# Patient Record
Sex: FEMALE | Race: WHITE | URBAN - METROPOLITAN AREA
[De-identification: names, ages, dates, MRNs, and addresses within clinical notes are randomized per-mention and may not be internally consistent; named-entity substitution may affect disease eponyms.]

---

## 2022-04-15 ENCOUNTER — HOSPITAL ENCOUNTER (EMERGENCY)
Age: 47
Discharge: HOME | End: 2022-04-15
Attending: EMERGENCY MEDICINE
Payer: COMMERCIAL

## 2022-04-15 ENCOUNTER — APPOINTMENT (OUTPATIENT)
Dept: CT IMAGING | Age: 47
End: 2022-04-15
Payer: COMMERCIAL

## 2022-04-15 VITALS
HEART RATE: 70 BPM | HEIGHT: 68 IN | WEIGHT: 190.7 LBS | TEMPERATURE: 97.6 F | OXYGEN SATURATION: 98 % | BODY MASS INDEX: 28.9 KG/M2 | DIASTOLIC BLOOD PRESSURE: 75 MMHG | RESPIRATION RATE: 16 BRPM | SYSTOLIC BLOOD PRESSURE: 129 MMHG

## 2022-04-15 DIAGNOSIS — R10.13 EPIGASTRIC PAIN: Primary | ICD-10-CM

## 2022-04-15 DIAGNOSIS — R11.0 NAUSEA: ICD-10-CM

## 2022-04-15 LAB
A/G RATIO: 1.7 (ref 1.1–2.2)
ALBUMIN SERPL-MCNC: 4.3 G/DL (ref 3.4–5)
ALP BLD-CCNC: 59 U/L (ref 40–129)
ALT SERPL-CCNC: 17 U/L (ref 10–40)
ANION GAP SERPL CALCULATED.3IONS-SCNC: 10 MMOL/L (ref 3–16)
AST SERPL-CCNC: 22 U/L (ref 15–37)
BACTERIA: ABNORMAL /HPF
BASOPHILS ABSOLUTE: 0 K/UL (ref 0–0.2)
BASOPHILS RELATIVE PERCENT: 0.3 %
BILIRUB SERPL-MCNC: <0.2 MG/DL (ref 0–1)
BILIRUBIN URINE: NEGATIVE
BLOOD, URINE: ABNORMAL
BUN BLDV-MCNC: 5 MG/DL (ref 7–20)
CALCIUM SERPL-MCNC: 9.3 MG/DL (ref 8.3–10.6)
CHLORIDE BLD-SCNC: 106 MMOL/L (ref 99–110)
CLARITY: CLEAR
CO2: 23 MMOL/L (ref 21–32)
COLOR: YELLOW
CREAT SERPL-MCNC: <0.5 MG/DL (ref 0.6–1.1)
EKG ATRIAL RATE: 78 BPM
EKG DIAGNOSIS: NORMAL
EKG P AXIS: 71 DEGREES
EKG P-R INTERVAL: 144 MS
EKG Q-T INTERVAL: 386 MS
EKG QRS DURATION: 88 MS
EKG QTC CALCULATION (BAZETT): 440 MS
EKG R AXIS: 79 DEGREES
EKG T AXIS: 62 DEGREES
EKG VENTRICULAR RATE: 78 BPM
EOSINOPHILS ABSOLUTE: 0 K/UL (ref 0–0.6)
EOSINOPHILS RELATIVE PERCENT: 0.3 %
EPITHELIAL CELLS, UA: ABNORMAL /HPF (ref 0–5)
GFR AFRICAN AMERICAN: >60
GFR NON-AFRICAN AMERICAN: >60
GLUCOSE BLD-MCNC: 109 MG/DL (ref 70–99)
GLUCOSE URINE: NEGATIVE MG/DL
HCG QUALITATIVE: NEGATIVE
HCT VFR BLD CALC: 37.7 % (ref 36–48)
HEMOGLOBIN: 12.7 G/DL (ref 12–16)
KETONES, URINE: >=80 MG/DL
LEUKOCYTE ESTERASE, URINE: NEGATIVE
LIPASE: 17 U/L (ref 13–60)
LYMPHOCYTES ABSOLUTE: 0.7 K/UL (ref 1–5.1)
LYMPHOCYTES RELATIVE PERCENT: 9 %
MCH RBC QN AUTO: 27.2 PG (ref 26–34)
MCHC RBC AUTO-ENTMCNC: 33.7 G/DL (ref 31–36)
MCV RBC AUTO: 80.7 FL (ref 80–100)
MICROSCOPIC EXAMINATION: YES
MONOCYTES ABSOLUTE: 0.8 K/UL (ref 0–1.3)
MONOCYTES RELATIVE PERCENT: 10.5 %
NEUTROPHILS ABSOLUTE: 6.2 K/UL (ref 1.7–7.7)
NEUTROPHILS RELATIVE PERCENT: 79.9 %
NITRITE, URINE: NEGATIVE
PDW BLD-RTO: 16.1 % (ref 12.4–15.4)
PH UA: 6 (ref 5–8)
PLATELET # BLD: 275 K/UL (ref 135–450)
PMV BLD AUTO: 8 FL (ref 5–10.5)
POTASSIUM REFLEX MAGNESIUM: 4.2 MMOL/L (ref 3.5–5.1)
PROTEIN UA: NEGATIVE MG/DL
RBC # BLD: 4.67 M/UL (ref 4–5.2)
RBC UA: ABNORMAL /HPF (ref 0–4)
SODIUM BLD-SCNC: 139 MMOL/L (ref 136–145)
SPECIFIC GRAVITY UA: <=1.005 (ref 1–1.03)
TOTAL PROTEIN: 6.9 G/DL (ref 6.4–8.2)
TROPONIN: <0.01 NG/ML
URINE REFLEX TO CULTURE: ABNORMAL
URINE TYPE: ABNORMAL
UROBILINOGEN, URINE: 0.2 E.U./DL
WBC # BLD: 7.7 K/UL (ref 4–11)
WBC UA: ABNORMAL /HPF (ref 0–5)

## 2022-04-15 PROCEDURE — 96374 THER/PROPH/DIAG INJ IV PUSH: CPT

## 2022-04-15 PROCEDURE — 81001 URINALYSIS AUTO W/SCOPE: CPT

## 2022-04-15 PROCEDURE — 83690 ASSAY OF LIPASE: CPT

## 2022-04-15 PROCEDURE — 99284 EMERGENCY DEPT VISIT MOD MDM: CPT

## 2022-04-15 PROCEDURE — 84703 CHORIONIC GONADOTROPIN ASSAY: CPT

## 2022-04-15 PROCEDURE — 6360000004 HC RX CONTRAST MEDICATION: Performed by: EMERGENCY MEDICINE

## 2022-04-15 PROCEDURE — 85025 COMPLETE CBC W/AUTO DIFF WBC: CPT

## 2022-04-15 PROCEDURE — 93010 ELECTROCARDIOGRAM REPORT: CPT | Performed by: INTERNAL MEDICINE

## 2022-04-15 PROCEDURE — 93005 ELECTROCARDIOGRAM TRACING: CPT | Performed by: EMERGENCY MEDICINE

## 2022-04-15 PROCEDURE — 74177 CT ABD & PELVIS W/CONTRAST: CPT

## 2022-04-15 PROCEDURE — 6360000002 HC RX W HCPCS: Performed by: EMERGENCY MEDICINE

## 2022-04-15 PROCEDURE — 84484 ASSAY OF TROPONIN QUANT: CPT

## 2022-04-15 PROCEDURE — 80053 COMPREHEN METABOLIC PANEL: CPT

## 2022-04-15 RX ORDER — DICYCLOMINE HYDROCHLORIDE 10 MG/1
10 CAPSULE ORAL
Qty: 30 CAPSULE | Refills: 0 | Status: SHIPPED | OUTPATIENT
Start: 2022-04-15 | End: 2023-04-15

## 2022-04-15 RX ORDER — ONDANSETRON 2 MG/ML
4 INJECTION INTRAMUSCULAR; INTRAVENOUS ONCE
Status: COMPLETED | OUTPATIENT
Start: 2022-04-15 | End: 2022-04-15

## 2022-04-15 RX ORDER — KETOROLAC TROMETHAMINE 30 MG/ML
15 INJECTION, SOLUTION INTRAMUSCULAR; INTRAVENOUS ONCE
Status: COMPLETED | OUTPATIENT
Start: 2022-04-15 | End: 2022-04-15

## 2022-04-15 RX ORDER — ONDANSETRON 4 MG/1
4 TABLET, ORALLY DISINTEGRATING ORAL EVERY 8 HOURS PRN
Qty: 15 TABLET | Refills: 1 | Status: SHIPPED | OUTPATIENT
Start: 2022-04-15

## 2022-04-15 RX ADMIN — IOPAMIDOL 80 ML: 755 INJECTION, SOLUTION INTRAVENOUS at 06:50

## 2022-04-15 RX ADMIN — ONDANSETRON 4 MG: 2 INJECTION INTRAMUSCULAR; INTRAVENOUS at 06:16

## 2022-04-15 RX ADMIN — KETOROLAC TROMETHAMINE 15 MG: 30 INJECTION, SOLUTION INTRAMUSCULAR; INTRAVENOUS at 06:17

## 2022-04-15 ASSESSMENT — PAIN DESCRIPTION - FREQUENCY
FREQUENCY: CONTINUOUS
FREQUENCY: CONTINUOUS

## 2022-04-15 ASSESSMENT — PAIN SCALES - GENERAL
PAINLEVEL_OUTOF10: 8
PAINLEVEL_OUTOF10: 7
PAINLEVEL_OUTOF10: 2

## 2022-04-15 ASSESSMENT — PAIN DESCRIPTION - DESCRIPTORS
DESCRIPTORS: CONSTANT;DISCOMFORT
DESCRIPTORS: DISCOMFORT

## 2022-04-15 ASSESSMENT — PAIN - FUNCTIONAL ASSESSMENT: PAIN_FUNCTIONAL_ASSESSMENT: 0-10

## 2022-04-15 ASSESSMENT — PAIN DESCRIPTION - ORIENTATION
ORIENTATION: UPPER
ORIENTATION: OTHER (COMMENT)

## 2022-04-15 ASSESSMENT — PAIN DESCRIPTION - LOCATION
LOCATION: ABDOMEN
LOCATION: ABDOMEN

## 2022-04-15 ASSESSMENT — PAIN DESCRIPTION - PAIN TYPE: TYPE: ACUTE PAIN

## 2022-04-15 NOTE — ED PROVIDER NOTES
[address] [facility] PROVIDER NOTE    Patient Identification  Pt Name: Danell Dance  MRN: [id number]  Hagfbeatrice 1975  Date of evaluation: 4/15/2022  Provider: Bronwyn Soliman MD  PCP: No primary care provider on file. Chief Complaint  Abdominal Pain      HPI  History provided by patient   This is a 55 y.o. female who presents to the ED for chief complaint of abdominal pain. Started with nausea on Monday that has been intermittent. Pain started on Wednesday. No exacerbating remitting factors. No triggers. Unchanged by food. No vomiting. Is having some loose stools but not exactly diarrhea. She is currently on her menses. No suprapubic discomfort. Pain is nonradiating. It is epigastric. No prior abdominal surgeries. No chest pain or shortness of breath. . Non pleuritic. ROS  12 systems reviewed, pertinent positives/negatives per HPI otherwise noted to be negative. I have reviewed the following nursing documentation:  Allergies: Patient has no known allergies. Past medical history: History reviewed. No pertinent past medical history. Past surgical history: History reviewed. No pertinent surgical history. Home medications:   Previous Medications    No medications on file       Social history:  reports that she has never smoked. She has never used smokeless tobacco. She reports current alcohol use. She reports that she does not use drugs. Family history:  History reviewed. No pertinent family history. Exam  ED Triage Vitals [04/15/22 0538]   BP Temp Temp Source Pulse Resp SpO2 Height Weight   132/77 97.6 °F (36.4 °C) Oral 96 16 100 % 5' 8\" (1.727 m) 190 lb 11.2 oz (86.5 kg)     Nursing note and vitals reviewed. Constitutional: In no acute distress  HENT:      Head: Normocephalic      Ears: External ears normal.      Nose: Nose normal.     Mouth: Membrane mucosa moist   Eyes: No discharge. Neck: Supple. Trachea midline. Cardiovascular: Regular rate.  Warm extremities  Pulmonary/Chest: Effort normal. No respiratory distress. Abdominal: Soft. No distension. Mild epigastric tenderness w/o rebound  : Deferred  Rectal: Deferred   Musculoskeletal: Moves all extremities. No gross deformity. Neurological: Alert and oriented. Face symmetric. Speech is clear. Skin: Warm and dry. Psychiatric: Normal mood and affect. Behavior is normal.    Procedures      EKG  The Ekg interpreted by me in the absence of a cardiologist shows. Normal sinus rhythm. No specific ST changes appreciated.   HR 78  No prior EKG for comparison      Radiology  CT ABDOMEN PELVIS W IV CONTRAST Additional Contrast? None    (Results Pending)       Labs  Results for orders placed or performed during the hospital encounter of 04/15/22   CBC with Auto Differential   Result Value Ref Range    WBC 7.7 4.0 - 11.0 K/uL    RBC 4.67 4.00 - 5.20 M/uL    Hemoglobin 12.7 12.0 - 16.0 g/dL    Hematocrit 37.7 36.0 - 48.0 %    MCV 80.7 80.0 - 100.0 fL    MCH 27.2 26.0 - 34.0 pg    MCHC 33.7 31.0 - 36.0 g/dL    RDW 16.1 (H) 12.4 - 15.4 %    Platelets 810 130 - 870 K/uL    MPV 8.0 5.0 - 10.5 fL    Neutrophils % 79.9 %    Lymphocytes % 9.0 %    Monocytes % 10.5 %    Eosinophils % 0.3 %    Basophils % 0.3 %    Neutrophils Absolute 6.2 1.7 - 7.7 K/uL    Lymphocytes Absolute 0.7 (L) 1.0 - 5.1 K/uL    Monocytes Absolute 0.8 0.0 - 1.3 K/uL    Eosinophils Absolute 0.0 0.0 - 0.6 K/uL    Basophils Absolute 0.0 0.0 - 0.2 K/uL   Comprehensive Metabolic Panel w/ Reflex to MG   Result Value Ref Range    Sodium 139 136 - 145 mmol/L    Potassium reflex Magnesium 4.2 3.5 - 5.1 mmol/L    Chloride 106 99 - 110 mmol/L    CO2 23 21 - 32 mmol/L    Anion Gap 10 3 - 16    Glucose 109 (H) 70 - 99 mg/dL    BUN 5 (L) 7 - 20 mg/dL    CREATININE <0.5 (L) 0.6 - 1.1 mg/dL    GFR Non-African American >60 >60    GFR African American >60 >60    Calcium 9.3 8.3 - 10.6 mg/dL    Total Protein 6.9 6.4 - 8.2 g/dL    Albumin 4.3 3.4 - 5.0 g/dL Albumin/Globulin Ratio 1.7 1.1 - 2.2    Total Bilirubin <0.2 0.0 - 1.0 mg/dL    Alkaline Phosphatase 59 40 - 129 U/L    ALT 17 10 - 40 U/L    AST 22 15 - 37 U/L   HCG Qualitative, Serum   Result Value Ref Range    hCG Qual Negative Detects HCG level >10 MIU/mL   Lipase   Result Value Ref Range    Lipase 17.0 13.0 - 60.0 U/L   Troponin   Result Value Ref Range    Troponin <0.01 <0.01 ng/mL   EKG 12 Lead   Result Value Ref Range    Ventricular Rate 78 BPM    Atrial Rate 78 BPM    P-R Interval 144 ms    QRS Duration 88 ms    Q-T Interval 386 ms    QTc Calculation (Bazett) 440 ms    P Axis 71 degrees    R Axis 79 degrees    T Axis 62 degrees    Diagnosis       Normal sinus rhythmPossible Left atrial enlargementBorderline ECG       Screenings   [place] Coma Scale  Eye Opening: Spontaneous  Best Verbal Response: Oriented  Best Motor Response: Obeys commands  [place] Coma Scale Score: 15       MDM and ED Course  This is a 55 y.o. female who presents to the ED for epigastric abdominal pain and nausea. Differential includes cholecystitis, pancreatitis, peptic ulcer disease, gastritis, bowel obstruction, ACS. Obtaining CT abdomen pelvis with contrast.    Patient signed out to Dr. Sergey Padron at about 7am pending CT scan results, reassessment. [unfilled]    Final Impression  1. Abdominal pain, unspecified abdominal location        Blood pressure 132/77, pulse 96, temperature 97.6 °F (36.4 °C), temperature source Oral, resp. rate 16, height 5' 8\" (1.727 m), weight 190 lb 11.2 oz (86.5 kg), last menstrual period 04/15/2022, SpO2 100 %. Disposition:  DISPOSITION        Patient Referrals:  No follow-up provider specified. Discharge Medications:  New Prescriptions    No medications on file       Discontinued Medications:  Discontinued Medications    No medications on file       This chart was generated using the Yasmo dictation system.  I created this record but it may contain dictation errors given the limitations of this technology.         Sudhakar Pickard MD  04/15/22 0012

## 2022-04-15 NOTE — ED NOTES
Patient states that her pain is a one, nausea gone, feels much better.      Meseret Gooden RN  04/15/22 4843

## 2022-04-15 NOTE — ED TRIAGE NOTES
Patient c/o upper abd pain with nausea. Nausea started on Monday, has been intermittent. Pain started Wednesday. Has been up all night due to pain. EMD here,  with pt.

## 2022-04-15 NOTE — ED PROVIDER NOTES
This patient was turned over to me at 0700 by Dr. Cain Mitchell. Please see his dictated record. Patient is a 80-year-old female who presents complaining of epigastric abdominal discomfort and nausea for the past few days. She had nausea that started around April 11 followed by some epigastric pain that started on April 13. No change in the pain with eating but she states her appetite has been decreased because of the nausea. She has not vomited. She has had some slight loose stools but no watery diarrhea. She states that she had some intermittent pain in the epigastric area a week ago. Denies chest pain or shortness of breath. She denies any indigestion, acid or reflux symptoms. She states she thought she might have had food poisoning. She admits she has not been drinking enough fluids. She is currently on her menstrual period. On arrival the patient was afebrile with normal vital signs. She received Zofran and Toradol prior to my arrival with resolution of her nausea and pain. CBC, CMP, lipase, hCG and troponin were normal.  EKG read by Dr. Cain Mitchell shows no ischemia or arrhythmia. CT scan of the abdomen pelvis with IV contrast read by the radiologist and reviewed by myself shows no acute intra-abdominal abnormality. Urinalysis shows positive blood on dipstick with 0-2 white cells, 3-4 red cells and negative nitrite. Ketones were greater than 80. On my exam at 0800, the patient appears comfortable and states she is not having pain or nausea. She has minimal tenderness in the epigastric area to palpation but no other abdominal tenderness. She was offered IV fluids given her urine ketones but declined stating she is feeling better and thinks she can drink fluids more easily now. She denies acid symptoms. This may be secondary to a viral illness or possibly biliary colic. No evidence for pancreatitis, pyelonephritis, ureteral colic, bowel obstruction or surgical abdomen.   She was given a prescription for gallbladder ultrasound. She was given prescriptions for Zofran for nausea and Bentyl if needed for pain. Recommended increased fluids by mouth and avoidance of fatty foods short-term. I recommended follow-up with primary care and return here for worse symptoms.       DX: 1) epigastric abdominal pain         2) nausea     Clarence Mckinney MD  04/15/22 8198

## 2022-04-15 NOTE — ED NOTES
Patient requests water, pain decrease to 5/10. Pt laughing with . Patient aware that she is NPO for now, until CT return and nausea gone.      Emre Danielson RN  04/15/22 4205

## 2022-04-22 ENCOUNTER — HOSPITAL ENCOUNTER (OUTPATIENT)
Dept: ULTRASOUND IMAGING | Age: 47
Discharge: HOME | End: 2022-04-22
Payer: COMMERCIAL

## 2022-04-22 DIAGNOSIS — R10.13 EPIGASTRIC PAIN: ICD-10-CM

## 2022-04-22 DIAGNOSIS — R11.0 NAUSEA: ICD-10-CM

## 2022-04-22 PROCEDURE — 76705 ECHO EXAM OF ABDOMEN: CPT

## 2024-01-07 ENCOUNTER — HOSPITAL ENCOUNTER (INPATIENT)
Age: 49
LOS: 4 days | Discharge: HOME | DRG: 064 | End: 2024-01-11
Attending: EMERGENCY MEDICINE | Admitting: INTERNAL MEDICINE
Payer: COMMERCIAL

## 2024-01-07 ENCOUNTER — APPOINTMENT (OUTPATIENT)
Dept: CT IMAGING | Age: 49
DRG: 064 | End: 2024-01-07
Payer: COMMERCIAL

## 2024-01-07 DIAGNOSIS — I61.9 INTRAPARENCHYMAL HEMORRHAGE OF BRAIN (HCC): ICD-10-CM

## 2024-01-07 DIAGNOSIS — G08 CEREBRAL VENOUS SINUS THROMBOSIS: Primary | ICD-10-CM

## 2024-01-07 PROBLEM — I62.9 INTRACRANIAL HEMORRHAGE (HCC): Status: ACTIVE | Noted: 2024-01-07

## 2024-01-07 LAB
ANION GAP SERPL CALCULATED.3IONS-SCNC: 10 MMOL/L (ref 3–16)
ANTI-XA UNFRAC HEPARIN: <0.1 IU/ML (ref 0.3–0.7)
APTT BLD: 22.4 SEC (ref 22.7–35.9)
BASOPHILS # BLD: 0 K/UL (ref 0–0.2)
BASOPHILS NFR BLD: 0.5 %
BUN SERPL-MCNC: 9 MG/DL (ref 7–20)
CALCIUM SERPL-MCNC: 9.1 MG/DL (ref 8.3–10.6)
CHLORIDE SERPL-SCNC: 106 MMOL/L (ref 99–110)
CO2 SERPL-SCNC: 23 MMOL/L (ref 21–32)
CREAT SERPL-MCNC: <0.5 MG/DL (ref 0.6–1.1)
DEPRECATED RDW RBC AUTO: 16.9 % (ref 12.4–15.4)
DEPRECATED RDW RBC AUTO: 17 % (ref 12.4–15.4)
EOSINOPHIL # BLD: 0.1 K/UL (ref 0–0.6)
EOSINOPHIL NFR BLD: 1.3 %
GFR SERPLBLD CREATININE-BSD FMLA CKD-EPI: >60 ML/MIN/{1.73_M2}
GLUCOSE SERPL-MCNC: 150 MG/DL (ref 70–99)
HCG SERPL QL: NEGATIVE
HCT VFR BLD AUTO: 33.5 % (ref 36–48)
HCT VFR BLD AUTO: 34 % (ref 36–48)
HGB BLD-MCNC: 10.7 G/DL (ref 12–16)
HGB BLD-MCNC: 11 G/DL (ref 12–16)
INR PPP: 1 (ref 0.84–1.16)
INR PPP: 1.03 (ref 0.84–1.16)
LYMPHOCYTES # BLD: 0.7 K/UL (ref 1–5.1)
LYMPHOCYTES NFR BLD: 9.8 %
MCH RBC QN AUTO: 23.7 PG (ref 26–34)
MCH RBC QN AUTO: 23.8 PG (ref 26–34)
MCHC RBC AUTO-ENTMCNC: 32 G/DL (ref 31–36)
MCHC RBC AUTO-ENTMCNC: 32.3 G/DL (ref 31–36)
MCV RBC AUTO: 73.7 FL (ref 80–100)
MCV RBC AUTO: 74.1 FL (ref 80–100)
MONOCYTES # BLD: 0.5 K/UL (ref 0–1.3)
MONOCYTES NFR BLD: 7.6 %
NEUTROPHILS # BLD: 5.7 K/UL (ref 1.7–7.7)
NEUTROPHILS NFR BLD: 80.8 %
PLATELET # BLD AUTO: 422 K/UL (ref 135–450)
PLATELET # BLD AUTO: 476 K/UL (ref 135–450)
PMV BLD AUTO: 7.4 FL (ref 5–10.5)
PMV BLD AUTO: 7.7 FL (ref 5–10.5)
POTASSIUM SERPL-SCNC: 3.8 MMOL/L (ref 3.5–5.1)
PROTHROMBIN TIME: 13.2 SEC (ref 11.5–14.8)
PROTHROMBIN TIME: 13.5 SEC (ref 11.5–14.8)
RBC # BLD AUTO: 4.52 M/UL (ref 4–5.2)
RBC # BLD AUTO: 4.62 M/UL (ref 4–5.2)
SODIUM SERPL-SCNC: 139 MMOL/L (ref 136–145)
WBC # BLD AUTO: 7 K/UL (ref 4–11)
WBC # BLD AUTO: 8.8 K/UL (ref 4–11)

## 2024-01-07 PROCEDURE — 6360000002 HC RX W HCPCS: Performed by: EMERGENCY MEDICINE

## 2024-01-07 PROCEDURE — 70450 CT HEAD/BRAIN W/O DYE: CPT

## 2024-01-07 PROCEDURE — 85610 PROTHROMBIN TIME: CPT

## 2024-01-07 PROCEDURE — 85613 RUSSELL VIPER VENOM DILUTED: CPT

## 2024-01-07 PROCEDURE — 96375 TX/PRO/DX INJ NEW DRUG ADDON: CPT

## 2024-01-07 PROCEDURE — 6370000000 HC RX 637 (ALT 250 FOR IP): Performed by: EMERGENCY MEDICINE

## 2024-01-07 PROCEDURE — 85240 CLOT FACTOR VIII AHG 1 STAGE: CPT

## 2024-01-07 PROCEDURE — 85730 THROMBOPLASTIN TIME PARTIAL: CPT

## 2024-01-07 PROCEDURE — 70496 CT ANGIOGRAPHY HEAD: CPT

## 2024-01-07 PROCEDURE — 81241 F5 GENE: CPT

## 2024-01-07 PROCEDURE — 81240 F2 GENE: CPT

## 2024-01-07 PROCEDURE — 85305 CLOT INHIBIT PROT S TOTAL: CPT

## 2024-01-07 PROCEDURE — 85027 COMPLETE CBC AUTOMATED: CPT

## 2024-01-07 PROCEDURE — 80048 BASIC METABOLIC PNL TOTAL CA: CPT

## 2024-01-07 PROCEDURE — 85520 HEPARIN ASSAY: CPT

## 2024-01-07 PROCEDURE — 87040 BLOOD CULTURE FOR BACTERIA: CPT

## 2024-01-07 PROCEDURE — 6360000002 HC RX W HCPCS

## 2024-01-07 PROCEDURE — 2000000000 HC ICU R&B

## 2024-01-07 PROCEDURE — 36415 COLL VENOUS BLD VENIPUNCTURE: CPT

## 2024-01-07 PROCEDURE — 2580000003 HC RX 258

## 2024-01-07 PROCEDURE — 85302 CLOT INHIBIT PROT C ANTIGEN: CPT

## 2024-01-07 PROCEDURE — 99291 CRITICAL CARE FIRST HOUR: CPT

## 2024-01-07 PROCEDURE — 6360000004 HC RX CONTRAST MEDICATION: Performed by: EMERGENCY MEDICINE

## 2024-01-07 PROCEDURE — 85300 ANTITHROMBIN III ACTIVITY: CPT

## 2024-01-07 PROCEDURE — 6360000004 HC RX CONTRAST MEDICATION

## 2024-01-07 PROCEDURE — 83090 ASSAY OF HOMOCYSTEINE: CPT

## 2024-01-07 PROCEDURE — 85025 COMPLETE CBC W/AUTO DIFF WBC: CPT

## 2024-01-07 PROCEDURE — 99285 EMERGENCY DEPT VISIT HI MDM: CPT

## 2024-01-07 PROCEDURE — 84703 CHORIONIC GONADOTROPIN ASSAY: CPT

## 2024-01-07 PROCEDURE — 85301 ANTITHROMBIN III ANTIGEN: CPT

## 2024-01-07 PROCEDURE — 2580000003 HC RX 258: Performed by: EMERGENCY MEDICINE

## 2024-01-07 PROCEDURE — 6370000000 HC RX 637 (ALT 250 FOR IP)

## 2024-01-07 PROCEDURE — 96374 THER/PROPH/DIAG INJ IV PUSH: CPT

## 2024-01-07 PROCEDURE — 70498 CT ANGIOGRAPHY NECK: CPT

## 2024-01-07 RX ORDER — ACETAMINOPHEN 325 MG/1
650 TABLET ORAL EVERY 4 HOURS PRN
Status: DISCONTINUED | OUTPATIENT
Start: 2024-01-07 | End: 2024-01-11 | Stop reason: HOSPADM

## 2024-01-07 RX ORDER — ONDANSETRON 4 MG/1
4 TABLET, ORALLY DISINTEGRATING ORAL EVERY 8 HOURS PRN
Status: DISCONTINUED | OUTPATIENT
Start: 2024-01-07 | End: 2024-01-11 | Stop reason: HOSPADM

## 2024-01-07 RX ORDER — 0.9 % SODIUM CHLORIDE 0.9 %
1000 INTRAVENOUS SOLUTION INTRAVENOUS ONCE
Status: COMPLETED | OUTPATIENT
Start: 2024-01-07 | End: 2024-01-07

## 2024-01-07 RX ORDER — HEPARIN SODIUM 10000 [USP'U]/100ML
5-30 INJECTION, SOLUTION INTRAVENOUS CONTINUOUS
Status: DISPENSED | OUTPATIENT
Start: 2024-01-07 | End: 2024-01-11

## 2024-01-07 RX ORDER — SODIUM CHLORIDE 0.9 % (FLUSH) 0.9 %
5-40 SYRINGE (ML) INJECTION EVERY 12 HOURS SCHEDULED
Status: DISCONTINUED | OUTPATIENT
Start: 2024-01-07 | End: 2024-01-11 | Stop reason: HOSPADM

## 2024-01-07 RX ORDER — LABETALOL HYDROCHLORIDE 5 MG/ML
10 INJECTION, SOLUTION INTRAVENOUS EVERY 4 HOURS PRN
Status: DISCONTINUED | OUTPATIENT
Start: 2024-01-07 | End: 2024-01-11 | Stop reason: HOSPADM

## 2024-01-07 RX ORDER — SODIUM CHLORIDE 9 MG/ML
INJECTION, SOLUTION INTRAVENOUS PRN
Status: DISCONTINUED | OUTPATIENT
Start: 2024-01-07 | End: 2024-01-11 | Stop reason: HOSPADM

## 2024-01-07 RX ORDER — ACETAMINOPHEN 500 MG
1000 TABLET ORAL ONCE
Status: COMPLETED | OUTPATIENT
Start: 2024-01-07 | End: 2024-01-07

## 2024-01-07 RX ORDER — ONDANSETRON 2 MG/ML
4 INJECTION INTRAMUSCULAR; INTRAVENOUS EVERY 6 HOURS PRN
Status: DISCONTINUED | OUTPATIENT
Start: 2024-01-07 | End: 2024-01-11 | Stop reason: HOSPADM

## 2024-01-07 RX ORDER — SODIUM CHLORIDE 0.9 % (FLUSH) 0.9 %
5-40 SYRINGE (ML) INJECTION PRN
Status: DISCONTINUED | OUTPATIENT
Start: 2024-01-07 | End: 2024-01-11 | Stop reason: HOSPADM

## 2024-01-07 RX ORDER — DIPHENHYDRAMINE HYDROCHLORIDE 50 MG/ML
25 INJECTION INTRAMUSCULAR; INTRAVENOUS ONCE
Status: COMPLETED | OUTPATIENT
Start: 2024-01-07 | End: 2024-01-07

## 2024-01-07 RX ORDER — METOCLOPRAMIDE HYDROCHLORIDE 5 MG/ML
10 INJECTION INTRAMUSCULAR; INTRAVENOUS ONCE
Status: COMPLETED | OUTPATIENT
Start: 2024-01-07 | End: 2024-01-07

## 2024-01-07 RX ADMIN — METOCLOPRAMIDE HYDROCHLORIDE 10 MG: 5 INJECTION INTRAMUSCULAR; INTRAVENOUS at 15:19

## 2024-01-07 RX ADMIN — SODIUM CHLORIDE 1000 ML: 9 INJECTION, SOLUTION INTRAVENOUS at 15:17

## 2024-01-07 RX ADMIN — IOPAMIDOL 100 ML: 755 INJECTION, SOLUTION INTRAVENOUS at 16:01

## 2024-01-07 RX ADMIN — ACETAMINOPHEN 1000 MG: 500 TABLET ORAL at 15:16

## 2024-01-07 RX ADMIN — ACETAMINOPHEN 650 MG: 325 TABLET ORAL at 20:52

## 2024-01-07 RX ADMIN — IOPAMIDOL 75 ML: 755 INJECTION, SOLUTION INTRAVENOUS at 20:22

## 2024-01-07 RX ADMIN — HEPARIN SODIUM 11 UNITS/KG/HR: 10000 INJECTION, SOLUTION INTRAVENOUS at 21:54

## 2024-01-07 RX ADMIN — DIPHENHYDRAMINE HYDROCHLORIDE 25 MG: 50 INJECTION INTRAMUSCULAR; INTRAVENOUS at 15:19

## 2024-01-07 RX ADMIN — SODIUM CHLORIDE, PRESERVATIVE FREE 10 ML: 5 INJECTION INTRAVENOUS at 21:07

## 2024-01-07 ASSESSMENT — PAIN SCALES - GENERAL
PAINLEVEL_OUTOF10: 9
PAINLEVEL_OUTOF10: 4
PAINLEVEL_OUTOF10: 0

## 2024-01-07 ASSESSMENT — PAIN - FUNCTIONAL ASSESSMENT: PAIN_FUNCTIONAL_ASSESSMENT: 0-10

## 2024-01-07 ASSESSMENT — VISUAL ACUITY
OS: 20/25
OD: 20/25

## 2024-01-07 ASSESSMENT — PAIN DESCRIPTION - DESCRIPTORS
DESCRIPTORS: PRESSURE
DESCRIPTORS: ACHING

## 2024-01-07 ASSESSMENT — PAIN DESCRIPTION - ORIENTATION
ORIENTATION: LEFT
ORIENTATION: LEFT;POSTERIOR

## 2024-01-07 ASSESSMENT — PAIN DESCRIPTION - LOCATION
LOCATION: EYE
LOCATION: EYE

## 2024-01-07 NOTE — ED NOTES
Patient to transfer to room # 4501 report called to Teddy at [facility], patient and family updated waiting eta for transport, patient continues to remain quiet bed at a 30 degree angle.

## 2024-01-07 NOTE — ED NOTES
Patient to ed with complaints of left eye pressure/pain which patient noticed at 0200, last known well at 1200am.  Patient reports she woke up at 0800 and still had left eye pain and took sinus medication.  Patient reports while watching TV she started to see circles and lines which has resolved, patient continues to report left eye pressure and has some nausea.

## 2024-01-07 NOTE — ED PROVIDER NOTES
Emergency Department Provider Note  Location: [facility] EMERGENCY DEPARTMENT  1/7/2024     Patient Identification  Saba Espinoza is a 48 y.o. female    Chief Complaint  Eye Pain (Left eye)          HPI  (History provided by patient)  Patient is a 48-year-old female who presents with left-sided retro-orbital headache since 2 AM last night and transient vision changes out of the right eye now resolved.  Patient reports she felt well at her baseline when she went to bed woke up at 2 AM had sharp stabbing pain behind the left eye.  Went back to bed and woke up later in the morning and it was worse.  Denies any pain with ocular movement.  She took over-the-counter medications which did not help.  She was watching TV watching football game when she noticed transiently that it seemed like letters were falling off on the right side of her visual field she is unsure about of which eye.  She also noticed a few minutes later that she had a scotoma out of the right eye.  Lasted for few minutes and resolved spontaneously.  She does not wear glasses or contact lenses.  She denies any trauma.  She reports she is significantly nauseous and dry heaving.  No neck pain or stiffness no fevers or chills.      Nursing Notes were all reviewed and agreed with, or any disagreements were addressed in the HPI:  Allergies: No Known Allergies    Past medical history:  has no past medical history on file.    Past surgical history:  has no past surgical history on file.    Home medications:   Prior to Admission medications    Medication Sig Start Date End Date Taking? Authorizing Provider   dicyclomine (BENTYL) 10 MG capsule Take 1 capsule by mouth 4 times daily (before meals and nightly) Prn pain 4/15/22 4/15/23  Zafar Maravilla MD   ondansetron (ZOFRAN ODT) 4 MG disintegrating tablet Take 1 tablet by mouth every 8 hours as needed for Nausea or Vomiting May Sub regular tablet (non-ODT) if insurance does not cover ODT. 4/15/22

## 2024-01-07 NOTE — ED NOTES
Patient report to [facility] ems, patient to [facility],

## 2024-01-08 ENCOUNTER — APPOINTMENT (OUTPATIENT)
Dept: CT IMAGING | Age: 49
DRG: 064 | End: 2024-01-08
Payer: COMMERCIAL

## 2024-01-08 PROBLEM — G08 CEREBRAL VENOUS SINUS THROMBOSIS: Status: ACTIVE | Noted: 2024-01-08

## 2024-01-08 LAB
ANION GAP SERPL CALCULATED.3IONS-SCNC: 11 MMOL/L (ref 3–16)
ANTI-XA UNFRAC HEPARIN: 0.3 IU/ML (ref 0.3–0.7)
ANTI-XA UNFRAC HEPARIN: 0.35 IU/ML (ref 0.3–0.7)
ANTI-XA UNFRAC HEPARIN: <0.1 IU/ML (ref 0.3–0.7)
BUN SERPL-MCNC: 9 MG/DL (ref 7–20)
CALCIUM SERPL-MCNC: 8.6 MG/DL (ref 8.3–10.6)
CHLORIDE SERPL-SCNC: 107 MMOL/L (ref 99–110)
CHOLEST SERPL-MCNC: 173 MG/DL (ref 0–199)
CO2 SERPL-SCNC: 22 MMOL/L (ref 21–32)
CREAT SERPL-MCNC: 0.7 MG/DL (ref 0.6–1.1)
DEPRECATED RDW RBC AUTO: 17 % (ref 12.4–15.4)
FACT VIII ACT/NOR PPP: 269 % (ref 50–150)
FERRITIN SERPL IA-MCNC: 13.7 NG/ML (ref 15–150)
GFR SERPLBLD CREATININE-BSD FMLA CKD-EPI: >60 ML/MIN/{1.73_M2}
GLUCOSE SERPL-MCNC: 88 MG/DL (ref 70–99)
HCT VFR BLD AUTO: 32.1 % (ref 36–48)
HCYS SERPL-SCNC: 9 UMOL/L (ref 0–10)
HDLC SERPL-MCNC: 29 MG/DL (ref 40–60)
HGB BLD-MCNC: 10.5 G/DL (ref 12–16)
LDLC SERPL CALC-MCNC: 129 MG/DL
MCH RBC QN AUTO: 24.2 PG (ref 26–34)
MCHC RBC AUTO-ENTMCNC: 32.8 G/DL (ref 31–36)
MCV RBC AUTO: 73.9 FL (ref 80–100)
PLATELET # BLD AUTO: 413 K/UL (ref 135–450)
PMV BLD AUTO: 8 FL (ref 5–10.5)
POTASSIUM SERPL-SCNC: 3.7 MMOL/L (ref 3.5–5.1)
RBC # BLD AUTO: 4.34 M/UL (ref 4–5.2)
SODIUM SERPL-SCNC: 140 MMOL/L (ref 136–145)
TRIGL SERPL-MCNC: 75 MG/DL (ref 0–150)
VLDLC SERPL CALC-MCNC: 15 MG/DL
WBC # BLD AUTO: 7.4 K/UL (ref 4–11)

## 2024-01-08 PROCEDURE — 2000000000 HC ICU R&B

## 2024-01-08 PROCEDURE — 99223 1ST HOSP IP/OBS HIGH 75: CPT | Performed by: INTERNAL MEDICINE

## 2024-01-08 PROCEDURE — 83036 HEMOGLOBIN GLYCOSYLATED A1C: CPT

## 2024-01-08 PROCEDURE — 6360000002 HC RX W HCPCS

## 2024-01-08 PROCEDURE — 97116 GAIT TRAINING THERAPY: CPT

## 2024-01-08 PROCEDURE — 92610 EVALUATE SWALLOWING FUNCTION: CPT

## 2024-01-08 PROCEDURE — 85027 COMPLETE CBC AUTOMATED: CPT

## 2024-01-08 PROCEDURE — 6370000000 HC RX 637 (ALT 250 FOR IP)

## 2024-01-08 PROCEDURE — 99233 SBSQ HOSP IP/OBS HIGH 50: CPT | Performed by: PSYCHIATRY & NEUROLOGY

## 2024-01-08 PROCEDURE — 82728 ASSAY OF FERRITIN: CPT

## 2024-01-08 PROCEDURE — 94761 N-INVAS EAR/PLS OXIMETRY MLT: CPT

## 2024-01-08 PROCEDURE — 97161 PT EVAL LOW COMPLEX 20 MIN: CPT

## 2024-01-08 PROCEDURE — 70450 CT HEAD/BRAIN W/O DYE: CPT

## 2024-01-08 PROCEDURE — 82607 VITAMIN B-12: CPT

## 2024-01-08 PROCEDURE — 99221 1ST HOSP IP/OBS SF/LOW 40: CPT | Performed by: NURSE PRACTITIONER

## 2024-01-08 PROCEDURE — 83540 ASSAY OF IRON: CPT

## 2024-01-08 PROCEDURE — 85520 HEPARIN ASSAY: CPT

## 2024-01-08 PROCEDURE — 80048 BASIC METABOLIC PNL TOTAL CA: CPT

## 2024-01-08 PROCEDURE — 83550 IRON BINDING TEST: CPT

## 2024-01-08 PROCEDURE — 2580000003 HC RX 258

## 2024-01-08 PROCEDURE — 97535 SELF CARE MNGMENT TRAINING: CPT

## 2024-01-08 PROCEDURE — 80061 LIPID PANEL: CPT

## 2024-01-08 PROCEDURE — 36415 COLL VENOUS BLD VENIPUNCTURE: CPT

## 2024-01-08 PROCEDURE — 97165 OT EVAL LOW COMPLEX 30 MIN: CPT

## 2024-01-08 PROCEDURE — 82746 ASSAY OF FOLIC ACID SERUM: CPT

## 2024-01-08 RX ADMIN — ACETAMINOPHEN 650 MG: 325 TABLET ORAL at 21:46

## 2024-01-08 RX ADMIN — HEPARIN SODIUM 15 UNITS/KG/HR: 10000 INJECTION, SOLUTION INTRAVENOUS at 19:11

## 2024-01-08 RX ADMIN — ACETAMINOPHEN 650 MG: 325 TABLET ORAL at 17:15

## 2024-01-08 RX ADMIN — ACETAMINOPHEN 650 MG: 325 TABLET ORAL at 01:03

## 2024-01-08 RX ADMIN — ACETAMINOPHEN 650 MG: 325 TABLET ORAL at 13:02

## 2024-01-08 RX ADMIN — SODIUM CHLORIDE, PRESERVATIVE FREE 10 ML: 5 INJECTION INTRAVENOUS at 09:35

## 2024-01-08 ASSESSMENT — PAIN SCALES - GENERAL
PAINLEVEL_OUTOF10: 2
PAINLEVEL_OUTOF10: 1
PAINLEVEL_OUTOF10: 0
PAINLEVEL_OUTOF10: 3
PAINLEVEL_OUTOF10: 1
PAINLEVEL_OUTOF10: 1
PAINLEVEL_OUTOF10: 3
PAINLEVEL_OUTOF10: 4

## 2024-01-08 ASSESSMENT — PAIN DESCRIPTION - LOCATION
LOCATION: NECK
LOCATION: HEAD
LOCATION: EYE
LOCATION: HEAD
LOCATION: NECK

## 2024-01-08 ASSESSMENT — PAIN DESCRIPTION - ORIENTATION
ORIENTATION: ANTERIOR
ORIENTATION: MID
ORIENTATION: LEFT;POSTERIOR

## 2024-01-08 ASSESSMENT — PAIN DESCRIPTION - DESCRIPTORS
DESCRIPTORS: ACHING

## 2024-01-08 NOTE — CONSULTS
[facility]    Consult Note      Requesting Physician:  Juno Walter     CHIEF COMPLAINT:  HA       HISTORY OF PRESENT ILLNESS:    Ms. Espinoza  is a 48 y.o. female we are seeing in consultation for L transverse venous sinus thrombosis. She reported a sinus infection 1 week prior to presentation. Yesterday she developed a L sided retro orbital HA with a ring of light around her vision in her R eye. She presented to [facility] ED where Head CT showed Nonfilling left transverse sinus with possible filling defect and Left occipital lobe intraparenchymal hemorrhage. She was transferred to [facility] for further evaluation. CTV shows L transverse sinus thrombus.     She has no family hx of clotting disorders. Her brother had a stroke at 49 however he also had A fib. Her grandfather also had a stroke in his 40s but she notes he was quite obese. No miscarriages, no cardiac events.     She had just recently traveled by plane to [place]. She notes she does not drink a lot of fluids when she travels because she doesn't want to use the bathroom on the plane. She was also sick with a sinus infection prior to this event. Additionally she has been taking progestin since Nov for menorrhagia.     She has a microcytic anemia with thrombocytosis on presentation. Iron studies consistent with deficiency. She routinely donates blood and has been seeing her OB/GYN for management of heavy periods. Since stopping progestin with his admission she is already starting to have menstrual cramps and some bleeding. She also notes a hx of intermittent swelling in her L ankle.     She is UTD on mammogram/PAP but has never had a Colonscopy. Notes she did do the Cologuard last year.     Past Medical History:  History reviewed. No pertinent past medical history.    Past Surgical History:  History reviewed. No pertinent surgical 
  Neurocritical Care Consult Note      Patient: Saba Espinoza MRN: [id number]    YOB: 1975  Age: 48 y.o.  Sex: female   Unit: [facility] ICU TOWER  Room/Bed: 4501/4501-01 Location: [facility]    Date of Consultation: 1/7/2024  Date of Admission: 1/7/2024  2:11 PM ( LOS: 0 days )  Primary Care Physician: Traci Anaya   Consult Requested By: Nilsa Cordova, *    Reason for Consult: ICH    IMPRESSION & RECOMMENDATIONS     IMPRESSION:  Saba Espinoza is a 49 yo female who presents for L sided headache and transient visual disturbances in the setting of L occipital IPH 2/2 L transverse sinus thrombosis.    RECOMMENDATIONS:  -q1h neuro checks  -SBP <160mmHg, PRN labetalol in place  -neuro heparin gtt  -anti-Xa q6h  -hypercoag work up  -blood cultures  -CT head when anti-Xa therapeutic x2  -Airway Management  Supplemental O2 to maintain SaO2 > 95%  -PT/OT for eval/treat      Management and plan discussed with:   Bedside nurse  Dr. Grover  ICU team    AMPARO Singh - [facility]   Neurocritical Care   1/7/2024 9:16 PM  [employer]: [facility] Neurocritical Care      History of Present Illness     Saba Espinoza is a 48 y.o. y/o female with history significant for ADHD, on adderall at home..     Per my interview with the patient, she had gotten over a sinus infection a week ago. On 1/7, patient developed a L sided retro-orbital headache with an intermittent ring of light around her field of vision. Per chart review, patient taken to [facility] ED. CT head done showing a L parietal/occipital IPH and SAH. CTA head/neck done showing No significant arterial stenosis with a nonfilling L transverse sinus with possible filling defect. BP on arrival was 157/82. PT/INR was 13.5/1.03. Patient transferred to [facility] ICU for further evaluation and treatment. CTV head done after admission to ICU shows L occipital hemorrhage with L transverse sinus thrombosis.     Of note, patient denies any h/o 
changes. Imaging reveals left transverse / sigmoid sinus occlusion with small L occipital / post temporal hemorrhage and edema.       Plan:  No neurosurgical intervention indicated  Agree with neuro protocol heparin drip which has been initiated   Q 1 hour neuro checks   Repeat CT head once therapeutic  Neuro critical care/neurology to manage, we will only remain involved if condition worsens and she requires intervention  Thank you for consult, will follow peripherally. Call with questions              DISPO-ICU     AMPARO Lugo-CNP  Neurosurgery Nurse Practitioner  [phone]    Patient was discussed with Dr. Londono who agrees with above assessment and plan.     Electronically signed by: AMPARO Diego - NP, 1/8/2024 12:44 PM

## 2024-01-08 NOTE — H&P
ICU HISTORY AND PHYSICAL       Hospital Day:   ICU Day:                                                          Code:Full Code  Admit Date: 1/7/2024  PCP: Traci Anaya                                  CC: Headache    HISTORY OF PRESENT ILLNESS:   Patient is a 48 years old female with no past medical history who presented with left-sided retro-orbital headache since 2 AM last night along with some vision changes in the right eye.  Patient woke up due to this pain but then went back to bed and woke up later in the morning when it was worse and also associated with nausea when she decided to go to the ED for further evaluation.  She describes the left-sided retro-orbital pain as stabbing and she noticed hallows and circular lights in her right eye.  Patient did have some sinus infection few days back which resolved.  She denies of any fevers, chills, vomiting, abdominal pain, loss of consciousness, dizziness or lightheadedness.  She denies of taking any medications at home except for norethindrone 10 mg  which she was prescribed by her gynecologist for heavy menstrual bleeding in November.  She follows up with her primary care doctor with last visit being about an year ago.   On arrival to the ED patient was in no acute distress with blood pressure systolic 150, NIH stroke scale 0 with no neurodeficits, imaging showed concerns for a left parieto-occipital intraparenchymal hemorrhage and CTA with concerns for transverse sinus venous thrombosis.  Patient was transferred to the [facility] for further evaluation and treatment.    PAST HISTORY:   History reviewed. No pertinent past medical history.    History reviewed. No pertinent surgical history.    SocialHistory:   The patient lives at Home     Alcohol: occasional   Illicit drugs: no use  Tobacco: never    Family History:  History reviewed. No pertinent family history.    MEDICATIONS:     No current facility-administered medications on file prior to encounter.

## 2024-01-08 NOTE — NURSE NAVIGATOR
Patients personal risk factors specific to stroke/TIA include: Overweight (BMI 30.11)    Patient's chart reviewed for Stroke Core Measures and additional needs:    [x]   VTE prophylaxis - SCDs on, Heparin gtt - see eMAR   [x]   Swallow screen prior to PO intake   [x]   Lipids / A1C ordered or resulted - ordered, in process   [x]   Therapy ordered - PT, OT, SLP evals ordered   [x]   Care plan and Education template - Added, in progress    - Need: CT head when anti-Xa therapeutic x2 - currently subtherapeutic (0.10 IU/mL (1/08/24 0338 AM))     - Hypercoag work up in process.     Navigator to continue to follow patient while admitted, to assist with follow up and discharge planning as needed.     Nurse eSignature: Electronically signed by Varsha Hooks RN on 1/8/24 at 9:21 AM EST - Neuroscience Navigator

## 2024-01-08 NOTE — DISCHARGE INSTRUCTIONS
Please follow up with GI outpatient - Because your hemoglobin and iron stores are low, an outpatient colonoscopy needs to be completed as per Hem/Onc consultation recommendations.   Please follow up with your OBGYN outpatient - Because you were put on progestin to control heavy bleeding and your hemoglobin & Fe are already low, alternative options need to be explored to prevent recurring menorrhagia.   [facility] Stroke Program Survey  The [facility] [place] values your feedback related to your recent hospital visit and admission. We strive to improve our Neuroscience program to promote better outcomes and recoveries for all our patients.  The anonymous survey below consists of a few questions that are related to your stay and around your Stroke diagnosis, treatment, and recovery. It is anonymous and has only a few questions.  The estimated length of time needed to complete this survey is 3 minutes or less. Thank you for completing this survey!

## 2024-01-09 ENCOUNTER — APPOINTMENT (OUTPATIENT)
Dept: VASCULAR LAB | Age: 49
DRG: 064 | End: 2024-01-09
Payer: COMMERCIAL

## 2024-01-09 PROBLEM — I61.9 INTRAPARENCHYMAL HEMORRHAGE OF BRAIN (HCC): Status: ACTIVE | Noted: 2024-01-09

## 2024-01-09 LAB
ANION GAP SERPL CALCULATED.3IONS-SCNC: 10 MMOL/L (ref 3–16)
ANTI-XA UNFRAC HEPARIN: 0.44 IU/ML (ref 0.3–0.7)
APTT IMM NP PPP: ABNORMAL SEC (ref 32–48)
APTT P HEP NEUT PPP: ABNORMAL SEC (ref 32–48)
AT III ACT/NOR PPP CHRO: 92 % (ref 76–128)
AT III AG ACT/NOR PPP IA: 100 % (ref 82–136)
BILIRUB DIRECT SERPL-MCNC: <0.2 MG/DL (ref 0–0.3)
BILIRUB INDIRECT SERPL-MCNC: NORMAL MG/DL (ref 0–1)
BILIRUB SERPL-MCNC: <0.2 MG/DL (ref 0–1)
BUN SERPL-MCNC: 9 MG/DL (ref 7–20)
CALCIUM SERPL-MCNC: 8.6 MG/DL (ref 8.3–10.6)
CHLORIDE SERPL-SCNC: 107 MMOL/L (ref 99–110)
CO2 SERPL-SCNC: 22 MMOL/L (ref 21–32)
CONFIRM APTT STACLOT: ABNORMAL
CREAT SERPL-MCNC: 0.7 MG/DL (ref 0.6–1.1)
DEPRECATED RDW RBC AUTO: 16.6 % (ref 12.4–15.4)
DRVVT SCREEN TO CONFIRM RATIO: ABNORMAL RATIO
EST. AVERAGE GLUCOSE BLD GHB EST-MCNC: 116.9 MG/DL
FOLATE SERPL-MCNC: 10.14 NG/ML (ref 4.78–24.2)
GFR SERPLBLD CREATININE-BSD FMLA CKD-EPI: >60 ML/MIN/{1.73_M2}
GLUCOSE SERPL-MCNC: 106 MG/DL (ref 70–99)
HBA1C MFR BLD: 5.7 %
HCT VFR BLD AUTO: 33.3 % (ref 36–48)
HGB BLD-MCNC: 10.8 G/DL (ref 12–16)
IRON SATN MFR SERPL: 7 % (ref 15–50)
IRON SERPL-MCNC: 27 UG/DL (ref 37–145)
LA 3 SCREEN W REFLEX-IMP: ABNORMAL
LA NT DPL PPP QL: ABNORMAL
LDH SERPL L TO P-CCNC: 205 U/L (ref 100–190)
MCH RBC QN AUTO: 23.8 PG (ref 26–34)
MCHC RBC AUTO-ENTMCNC: 32.4 G/DL (ref 31–36)
MCV RBC AUTO: 73.3 FL (ref 80–100)
MIXING DRVVT: ABNORMAL SEC (ref 33–44)
PLATELET # BLD AUTO: 453 K/UL (ref 135–450)
PMV BLD AUTO: 7.9 FL (ref 5–10.5)
POTASSIUM SERPL-SCNC: 3.9 MMOL/L (ref 3.5–5.1)
PROT C AG ACT/NOR PPP IA: 86 % (ref 63–153)
PROT S AG ACT/NOR PPP IA: 93 % (ref 63–126)
PROTHROMBIN TIME: 13.2 SEC (ref 12–15.5)
RBC # BLD AUTO: 4.55 M/UL (ref 4–5.2)
REPTILASE TIME: ABNORMAL SEC
SCREEN APTT: 32 SEC (ref 32–48)
SCREEN DRVVT: 28 SEC (ref 33–44)
SODIUM SERPL-SCNC: 139 MMOL/L (ref 136–145)
THROMBIN TIME: ABNORMAL SEC (ref 14.7–19.5)
TIBC SERPL-MCNC: 388 UG/DL (ref 260–445)
VIT B12 SERPL-MCNC: 217 PG/ML (ref 211–911)
WBC # BLD AUTO: 6.7 K/UL (ref 4–11)

## 2024-01-09 PROCEDURE — 82248 BILIRUBIN DIRECT: CPT

## 2024-01-09 PROCEDURE — 93971 EXTREMITY STUDY: CPT

## 2024-01-09 PROCEDURE — 82247 BILIRUBIN TOTAL: CPT

## 2024-01-09 PROCEDURE — 2580000003 HC RX 258: Performed by: NURSE PRACTITIONER

## 2024-01-09 PROCEDURE — 36415 COLL VENOUS BLD VENIPUNCTURE: CPT

## 2024-01-09 PROCEDURE — 6360000002 HC RX W HCPCS

## 2024-01-09 PROCEDURE — 6370000000 HC RX 637 (ALT 250 FOR IP)

## 2024-01-09 PROCEDURE — 83615 LACTATE (LD) (LDH) ENZYME: CPT

## 2024-01-09 PROCEDURE — 80048 BASIC METABOLIC PNL TOTAL CA: CPT

## 2024-01-09 PROCEDURE — 2000000000 HC ICU R&B

## 2024-01-09 PROCEDURE — 99232 SBSQ HOSP IP/OBS MODERATE 35: CPT | Performed by: INTERNAL MEDICINE

## 2024-01-09 PROCEDURE — 1200000000 HC SEMI PRIVATE

## 2024-01-09 PROCEDURE — 2580000003 HC RX 258

## 2024-01-09 PROCEDURE — 85520 HEPARIN ASSAY: CPT

## 2024-01-09 PROCEDURE — 99232 SBSQ HOSP IP/OBS MODERATE 35: CPT

## 2024-01-09 PROCEDURE — 85027 COMPLETE CBC AUTOMATED: CPT

## 2024-01-09 PROCEDURE — 6360000002 HC RX W HCPCS: Performed by: NURSE PRACTITIONER

## 2024-01-09 RX ORDER — M-VIT,TX,IRON,MINS/CALC/FOLIC 27MG-0.4MG
1 TABLET ORAL
COMMUNITY

## 2024-01-09 RX ORDER — CYANOCOBALAMIN 1000 UG/ML
1000 INJECTION, SOLUTION INTRAMUSCULAR; SUBCUTANEOUS ONCE
Status: COMPLETED | OUTPATIENT
Start: 2024-01-09 | End: 2024-01-09

## 2024-01-09 RX ORDER — DEXTROAMPHETAMINE SACCHARATE, AMPHETAMINE ASPARTATE, DEXTROAMPHETAMINE SULFATE AND AMPHETAMINE SULFATE 2.5; 2.5; 2.5; 2.5 MG/1; MG/1; MG/1; MG/1
25 TABLET ORAL DAILY
COMMUNITY

## 2024-01-09 RX ADMIN — ACETAMINOPHEN 650 MG: 325 TABLET ORAL at 01:50

## 2024-01-09 RX ADMIN — HEPARIN SODIUM 15 UNITS/KG/HR: 10000 INJECTION, SOLUTION INTRAVENOUS at 15:59

## 2024-01-09 RX ADMIN — ONDANSETRON 4 MG: 2 INJECTION INTRAMUSCULAR; INTRAVENOUS at 03:47

## 2024-01-09 RX ADMIN — ACETAMINOPHEN 650 MG: 325 TABLET ORAL at 23:53

## 2024-01-09 RX ADMIN — ACETAMINOPHEN 650 MG: 325 TABLET ORAL at 11:49

## 2024-01-09 RX ADMIN — ACETAMINOPHEN 650 MG: 325 TABLET ORAL at 06:24

## 2024-01-09 RX ADMIN — IRON SUCROSE 300 MG: 20 INJECTION, SOLUTION INTRAVENOUS at 11:37

## 2024-01-09 RX ADMIN — CYANOCOBALAMIN 1000 MCG: 1000 INJECTION, SOLUTION INTRAMUSCULAR; SUBCUTANEOUS at 10:03

## 2024-01-09 RX ADMIN — SODIUM CHLORIDE, PRESERVATIVE FREE 10 ML: 5 INJECTION INTRAVENOUS at 10:03

## 2024-01-09 ASSESSMENT — PAIN SCALES - GENERAL
PAINLEVEL_OUTOF10: 1
PAINLEVEL_OUTOF10: 2
PAINLEVEL_OUTOF10: 4
PAINLEVEL_OUTOF10: 1
PAINLEVEL_OUTOF10: 2
PAINLEVEL_OUTOF10: 0
PAINLEVEL_OUTOF10: 6
PAINLEVEL_OUTOF10: 0
PAINLEVEL_OUTOF10: 1
PAINLEVEL_OUTOF10: 2
PAINLEVEL_OUTOF10: 3
PAINLEVEL_OUTOF10: 3

## 2024-01-09 ASSESSMENT — PAIN DESCRIPTION - LOCATION
LOCATION: HEAD

## 2024-01-09 ASSESSMENT — PAIN DESCRIPTION - DESCRIPTORS
DESCRIPTORS: ACHING

## 2024-01-09 ASSESSMENT — PAIN DESCRIPTION - ORIENTATION
ORIENTATION: MID

## 2024-01-09 ASSESSMENT — PAIN - FUNCTIONAL ASSESSMENT
PAIN_FUNCTIONAL_ASSESSMENT: ACTIVITIES ARE NOT PREVENTED

## 2024-01-09 ASSESSMENT — PAIN DESCRIPTION - ONSET: ONSET: ON-GOING

## 2024-01-09 ASSESSMENT — PAIN DESCRIPTION - FREQUENCY: FREQUENCY: CONTINUOUS

## 2024-01-09 ASSESSMENT — PAIN DESCRIPTION - PAIN TYPE: TYPE: ACUTE PAIN

## 2024-01-10 LAB
ANION GAP SERPL CALCULATED.3IONS-SCNC: 11 MMOL/L (ref 3–16)
ANTI-XA UNFRAC HEPARIN: 0.36 IU/ML (ref 0.3–0.7)
ANTI-XA UNFRAC HEPARIN: 0.49 IU/ML (ref 0.3–0.7)
ANTI-XA UNFRAC HEPARIN: 0.56 IU/ML (ref 0.3–0.7)
BUN SERPL-MCNC: 11 MG/DL (ref 7–20)
CALCIUM SERPL-MCNC: 8.9 MG/DL (ref 8.3–10.6)
CHLORIDE SERPL-SCNC: 105 MMOL/L (ref 99–110)
CO2 SERPL-SCNC: 23 MMOL/L (ref 21–32)
CREAT SERPL-MCNC: 0.8 MG/DL (ref 0.6–1.1)
DEPRECATED RDW RBC AUTO: 16.6 % (ref 12.4–15.4)
GFR SERPLBLD CREATININE-BSD FMLA CKD-EPI: >60 ML/MIN/{1.73_M2}
GLUCOSE SERPL-MCNC: 88 MG/DL (ref 70–99)
HCT VFR BLD AUTO: 34 % (ref 36–48)
HGB BLD-MCNC: 11 G/DL (ref 12–16)
MCH RBC QN AUTO: 23.6 PG (ref 26–34)
MCHC RBC AUTO-ENTMCNC: 32.3 G/DL (ref 31–36)
MCV RBC AUTO: 73.1 FL (ref 80–100)
PLATELET # BLD AUTO: 476 K/UL (ref 135–450)
PMV BLD AUTO: 7.6 FL (ref 5–10.5)
POTASSIUM SERPL-SCNC: 3.9 MMOL/L (ref 3.5–5.1)
RBC # BLD AUTO: 4.65 M/UL (ref 4–5.2)
SODIUM SERPL-SCNC: 139 MMOL/L (ref 136–145)
WBC # BLD AUTO: 6.7 K/UL (ref 4–11)

## 2024-01-10 PROCEDURE — 85520 HEPARIN ASSAY: CPT

## 2024-01-10 PROCEDURE — 81270 JAK2 GENE: CPT

## 2024-01-10 PROCEDURE — 6370000000 HC RX 637 (ALT 250 FOR IP): Performed by: INTERNAL MEDICINE

## 2024-01-10 PROCEDURE — 80048 BASIC METABOLIC PNL TOTAL CA: CPT

## 2024-01-10 PROCEDURE — 85027 COMPLETE CBC AUTOMATED: CPT

## 2024-01-10 PROCEDURE — 6360000002 HC RX W HCPCS

## 2024-01-10 PROCEDURE — 6370000000 HC RX 637 (ALT 250 FOR IP)

## 2024-01-10 PROCEDURE — 1200000000 HC SEMI PRIVATE

## 2024-01-10 PROCEDURE — 36415 COLL VENOUS BLD VENIPUNCTURE: CPT

## 2024-01-10 PROCEDURE — 99233 SBSQ HOSP IP/OBS HIGH 50: CPT

## 2024-01-10 RX ORDER — BISACODYL 10 MG
10 SUPPOSITORY, RECTAL RECTAL DAILY PRN
Status: DISCONTINUED | OUTPATIENT
Start: 2024-01-10 | End: 2024-01-11 | Stop reason: HOSPADM

## 2024-01-10 RX ORDER — POLYETHYLENE GLYCOL 3350 17 G/17G
17 POWDER, FOR SOLUTION ORAL DAILY
Status: DISCONTINUED | OUTPATIENT
Start: 2024-01-10 | End: 2024-01-11 | Stop reason: HOSPADM

## 2024-01-10 RX ORDER — DOCUSATE SODIUM 100 MG/1
100 CAPSULE, LIQUID FILLED ORAL DAILY
Status: DISCONTINUED | OUTPATIENT
Start: 2024-01-10 | End: 2024-01-11 | Stop reason: HOSPADM

## 2024-01-10 RX ADMIN — ACETAMINOPHEN 650 MG: 325 TABLET ORAL at 03:56

## 2024-01-10 RX ADMIN — HEPARIN SODIUM 13 UNITS/KG/HR: 10000 INJECTION, SOLUTION INTRAVENOUS at 12:00

## 2024-01-10 RX ADMIN — ACETAMINOPHEN 650 MG: 325 TABLET ORAL at 11:57

## 2024-01-10 RX ADMIN — DOCUSATE SODIUM 100 MG: 100 CAPSULE, LIQUID FILLED ORAL at 13:47

## 2024-01-10 RX ADMIN — POLYETHYLENE GLYCOL 3350 17 G: 17 POWDER, FOR SOLUTION ORAL at 13:47

## 2024-01-10 ASSESSMENT — PAIN SCALES - GENERAL
PAINLEVEL_OUTOF10: 0
PAINLEVEL_OUTOF10: 2
PAINLEVEL_OUTOF10: 0
PAINLEVEL_OUTOF10: 2
PAINLEVEL_OUTOF10: 1

## 2024-01-10 ASSESSMENT — PAIN DESCRIPTION - LOCATION
LOCATION: NECK
LOCATION: HEAD
LOCATION: NECK

## 2024-01-10 ASSESSMENT — PAIN DESCRIPTION - FREQUENCY: FREQUENCY: CONTINUOUS

## 2024-01-10 ASSESSMENT — PAIN DESCRIPTION - ONSET: ONSET: ON-GOING

## 2024-01-10 ASSESSMENT — PAIN DESCRIPTION - DESCRIPTORS
DESCRIPTORS: ACHING

## 2024-01-10 ASSESSMENT — PAIN DESCRIPTION - PAIN TYPE: TYPE: ACUTE PAIN

## 2024-01-10 ASSESSMENT — PAIN - FUNCTIONAL ASSESSMENT: PAIN_FUNCTIONAL_ASSESSMENT: ACTIVITIES ARE NOT PREVENTED

## 2024-01-10 ASSESSMENT — PAIN DESCRIPTION - ORIENTATION
ORIENTATION: POSTERIOR
ORIENTATION: POSTERIOR

## 2024-01-11 ENCOUNTER — APPOINTMENT (OUTPATIENT)
Dept: CT IMAGING | Age: 49
DRG: 064 | End: 2024-01-11
Payer: COMMERCIAL

## 2024-01-11 VITALS
TEMPERATURE: 98.4 F | OXYGEN SATURATION: 100 % | BODY MASS INDEX: 30.01 KG/M2 | HEIGHT: 68 IN | HEART RATE: 99 BPM | WEIGHT: 198 LBS | RESPIRATION RATE: 22 BRPM | DIASTOLIC BLOOD PRESSURE: 73 MMHG | SYSTOLIC BLOOD PRESSURE: 116 MMHG

## 2024-01-11 LAB
ANTI-XA UNFRAC HEPARIN: 0.22 IU/ML (ref 0.3–0.7)
ANTI-XA UNFRAC HEPARIN: 0.48 IU/ML (ref 0.3–0.7)
BACTERIA BLD CULT ORG #2: NORMAL
BACTERIA BLD CULT: NORMAL

## 2024-01-11 PROCEDURE — 6360000002 HC RX W HCPCS

## 2024-01-11 PROCEDURE — 36415 COLL VENOUS BLD VENIPUNCTURE: CPT

## 2024-01-11 PROCEDURE — 70496 CT ANGIOGRAPHY HEAD: CPT

## 2024-01-11 PROCEDURE — 6360000004 HC RX CONTRAST MEDICATION

## 2024-01-11 PROCEDURE — 6370000000 HC RX 637 (ALT 250 FOR IP)

## 2024-01-11 PROCEDURE — 99233 SBSQ HOSP IP/OBS HIGH 50: CPT

## 2024-01-11 PROCEDURE — 6370000000 HC RX 637 (ALT 250 FOR IP): Performed by: INTERNAL MEDICINE

## 2024-01-11 PROCEDURE — 85520 HEPARIN ASSAY: CPT

## 2024-01-11 RX ORDER — BUTALBITAL, ACETAMINOPHEN AND CAFFEINE 50; 325; 40 MG/1; MG/1; MG/1
1 TABLET ORAL EVERY 6 HOURS PRN
Qty: 20 TABLET | Refills: 0 | Status: SHIPPED | OUTPATIENT
Start: 2024-01-11 | End: 2024-01-16

## 2024-01-11 RX ORDER — BUTALBITAL, ACETAMINOPHEN AND CAFFEINE 50; 325; 40 MG/1; MG/1; MG/1
1 TABLET ORAL EVERY 6 HOURS PRN
Status: DISCONTINUED | OUTPATIENT
Start: 2024-01-11 | End: 2024-01-11 | Stop reason: HOSPADM

## 2024-01-11 RX ADMIN — APIXABAN 5 MG: 5 TABLET, FILM COATED ORAL at 13:35

## 2024-01-11 RX ADMIN — DOCUSATE SODIUM 100 MG: 100 CAPSULE, LIQUID FILLED ORAL at 08:17

## 2024-01-11 RX ADMIN — IOPAMIDOL 75 ML: 755 INJECTION, SOLUTION INTRAVENOUS at 10:16

## 2024-01-11 RX ADMIN — HEPARIN SODIUM 15 UNITS/KG/HR: 10000 INJECTION, SOLUTION INTRAVENOUS at 06:26

## 2024-01-11 RX ADMIN — ACETAMINOPHEN 650 MG: 325 TABLET ORAL at 00:02

## 2024-01-11 RX ADMIN — ACETAMINOPHEN 650 MG: 325 TABLET ORAL at 06:29

## 2024-01-11 ASSESSMENT — PAIN DESCRIPTION - ORIENTATION: ORIENTATION: ANTERIOR

## 2024-01-11 ASSESSMENT — PAIN DESCRIPTION - LOCATION
LOCATION: HEAD

## 2024-01-11 ASSESSMENT — PAIN SCALES - GENERAL
PAINLEVEL_OUTOF10: 4
PAINLEVEL_OUTOF10: 0
PAINLEVEL_OUTOF10: 1
PAINLEVEL_OUTOF10: 0
PAINLEVEL_OUTOF10: 0
PAINLEVEL_OUTOF10: 3

## 2024-01-11 ASSESSMENT — PAIN DESCRIPTION - PAIN TYPE
TYPE: ACUTE PAIN
TYPE: ACUTE PAIN

## 2024-01-11 ASSESSMENT — PAIN DESCRIPTION - DESCRIPTORS
DESCRIPTORS: ACHING
DESCRIPTORS: PRESSURE
DESCRIPTORS: ACHING

## 2024-01-11 ASSESSMENT — PAIN DESCRIPTION - FREQUENCY
FREQUENCY: CONTINUOUS
FREQUENCY: CONTINUOUS

## 2024-01-11 ASSESSMENT — PAIN DESCRIPTION - ONSET
ONSET: ON-GOING
ONSET: ON-GOING

## 2024-01-11 ASSESSMENT — PAIN - FUNCTIONAL ASSESSMENT
PAIN_FUNCTIONAL_ASSESSMENT: ACTIVITIES ARE NOT PREVENTED
PAIN_FUNCTIONAL_ASSESSMENT: ACTIVITIES ARE NOT PREVENTED

## 2024-01-11 NOTE — PLAN OF CARE
I have spoken with the referring physician from [facility] regarding this patient and have reviewed the chart and images. Imaging shows left transverse / sigmoid sinus occlusion with small L occipital / post temporal hemorrhage and edema.  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      The immediate plan of care will involve transfer to [facility] ICU for heparin anticoagulation and close neurological monitoring. Neurosurgery will not be initially involved unless her condition worsens and requires intervention.    
  Problem: Discharge Planning  Goal: Discharge to home or other facility with appropriate resources  1/11/2024 0845 by Alexis Bowers RN  Outcome: Progressing     Problem: Pain  Goal: Verbalizes/displays adequate comfort level or baseline comfort level  1/11/2024 0845 by Alexis Bowers RN  Outcome: Progressing     Problem: ABCDS Injury Assessment  Goal: Absence of physical injury  1/11/2024 0845 by Alexis Bowers RN  Outcome: Progressing     Problem: Neurosensory - Adult  Goal: Achieves stable or improved neurological status  1/11/2024 0845 by Alexis Bowers RN  Outcome: Progressing     Problem: Neurosensory - Adult  Goal: Absence of seizures  1/11/2024 0845 by Alexis Bowers RN  Outcome: Progressing     Problem: Respiratory - Adult  Goal: Achieves optimal ventilation and oxygenation  1/11/2024 0845 by Alexis Bowers RN  Outcome: Progressing     Problem: Cardiovascular - Adult  Goal: Maintains optimal cardiac output and hemodynamic stability  1/11/2024 0845 by Alexis Bowers RN  Outcome: Progressing     Problem: Skin/Tissue Integrity - Adult  Goal: Skin integrity remains intact  1/11/2024 0845 by Alexis Bowers RN  Outcome: Progressing     Problem: Musculoskeletal - Adult  Goal: Return mobility to safest level of function  1/11/2024 0845 by Alexis Bowers RN  Outcome: Progressing     Problem: Gastrointestinal - Adult  Goal: Minimal or absence of nausea and vomiting  1/11/2024 0845 by Alexis Bowers RN  Outcome: Progressing     Problem: Genitourinary - Adult  Goal: Absence of urinary retention  1/11/2024 0845 by Alexis Bowers RN  Outcome: Progressing     
  Problem: Discharge Planning  Goal: Discharge to home or other facility with appropriate resources  1/8/2024 0845 by ANASTASIA POLLARD  Outcome: Progressing  1/7/2024 2121 by Danisha Marrufo RN  Outcome: Progressing  Flowsheets (Taken 1/7/2024 2000)  Discharge to home or other facility with appropriate resources:   Identify barriers to discharge with patient and caregiver   Arrange for needed discharge resources and transportation as appropriate   Identify discharge learning needs (meds, wound care, etc)  1/7/2024 1946 by ANASTASIA POLLARD  Outcome: Progressing  Flowsheets (Taken 1/7/2024 1910)  Discharge to home or other facility with appropriate resources: Arrange for needed discharge resources and transportation as appropriate     Problem: Pain  Goal: Verbalizes/displays adequate comfort level or baseline comfort level  1/8/2024 0845 by ANASTASIA POLLARD  Outcome: Progressing  1/7/2024 2121 by Danisha Marrufo RN  Outcome: Progressing  1/7/2024 1946 by ANASTASIA POLLARD  Outcome: Progressing     Problem: ABCDS Injury Assessment  Goal: Absence of physical injury  1/8/2024 0845 by ANASTASIA POLLARD  Outcome: Progressing  1/7/2024 2121 by Danisha Marrufo RN  Outcome: Progressing  Flowsheets (Taken 1/7/2024 2110)  Absence of Physical Injury: Implement safety measures based on patient assessment  1/7/2024 1946 by ANASTASIA POLLARD  Outcome: Progressing  Flowsheets (Taken 1/7/2024 1944)  Absence of Physical Injury: Implement safety measures based on patient assessment     Problem: Neurosensory - Adult  Goal: Achieves stable or improved neurological status  1/8/2024 0845 by ANASTASIA POLLARD  Outcome: Progressing  1/7/2024 2121 by Danisha Marrufo, RN  Outcome: Progressing  Flowsheets (Taken 1/7/2024 2000)  Achieves stable or improved neurological status:   Assess for and report changes in neurological status   Initiate measures to prevent increased intracranial pressure   Maintain blood pressure and fluid volume within ordered 
  Problem: Discharge Planning  Goal: Discharge to home or other facility with appropriate resources  1/8/2024 1929 by Danisha Marrufo RN  Outcome: Progressing     Problem: Pain  Goal: Verbalizes/displays adequate comfort level or baseline comfort level  1/8/2024 1929 by Danisha Marrufo RN  Outcome: Progressing  Flowsheets  Taken 1/8/2024 1600 by ANASTASIA POLLARD  Verbalizes/displays adequate comfort level or baseline comfort level: Encourage patient to monitor pain and request assistance  Taken 1/8/2024 1200 by ANASTASIA POLLARD  Verbalizes/displays adequate comfort level or baseline comfort level: Encourage patient to monitor pain and request assistance     Problem: ABCDS Injury Assessment  Goal: Absence of physical injury  1/8/2024 1929 by Danisha Marrufo RN  Outcome: Progressing  Flowsheets  Taken 1/8/2024 1927 by Danisha Marrufo RN  Absence of Physical Injury: Implement safety measures based on patient assessment  Taken 1/8/2024 0852 by ANASTASIA POLLARD  Absence of Physical Injury: Implement safety measures based on patient assessment     Problem: Neurosensory - Adult  Goal: Achieves stable or improved neurological status  1/8/2024 1929 by Danisha Marrufo RN  Outcome: Progressing  Flowsheets (Taken 1/8/2024 0846 by ANASTASIA POLLARD)  Achieves stable or improved neurological status: Assess for and report changes in neurological status     Problem: Neurosensory - Adult  Goal: Absence of seizures  1/8/2024 1929 by Danisha Marrufo RN  Outcome: Progressing  Flowsheets (Taken 1/8/2024 0846 by ANASTASIA POLLARD)  Absence of seizures: Monitor for seizure activity.  If seizure occurs, document type and location of movements and any associated apnea     Problem: Respiratory - Adult  Goal: Achieves optimal ventilation and oxygenation  1/8/2024 1929 by Danisha Marrufo RN  Outcome: Progressing     Problem: Cardiovascular - Adult  Goal: Maintains optimal cardiac output and hemodynamic stability  1/8/2024 1929 by 
  Problem: Discharge Planning  Goal: Discharge to home or other facility with appropriate resources  1/9/2024 0809 by ANASTASIA POLLARD  Outcome: Progressing  Flowsheets  Taken 1/9/2024 0800 by ANASTASIA POLLARD  Discharge to home or other facility with appropriate resources: Identify barriers to discharge with patient and caregiver  Taken 1/8/2024 2000 by Danisha Marrufo, RN  Discharge to home or other facility with appropriate resources:   Identify barriers to discharge with patient and caregiver   Arrange for needed discharge resources and transportation as appropriate   Identify discharge learning needs (meds, wound care, etc)     Problem: Pain  Goal: Verbalizes/displays adequate comfort level or baseline comfort level  1/9/2024 0809 by ANASTASIA POLLARD  Outcome: Progressing  Flowsheets (Taken 1/9/2024 0700)  Verbalizes/displays adequate comfort level or baseline comfort level: Encourage patient to monitor pain and request assistance     Problem: ABCDS Injury Assessment  Goal: Absence of physical injury  1/9/2024 0809 by ANASTASIA POLLARD  Outcome: Progressing  Flowsheets (Taken 1/9/2024 0757)  Absence of Physical Injury: Implement safety measures based on patient assessment     Problem: Neurosensory - Adult  Goal: Achieves stable or improved neurological status  1/9/2024 0809 by ANASTASIA POLLARD  Outcome: Progressing  Flowsheets (Taken 1/9/2024 0800)  Achieves stable or improved neurological status: Assess for and report changes in neurological status     Problem: Neurosensory - Adult  Goal: Absence of seizures  1/9/2024 0809 by ANASTASIA POLLARD  Outcome: Progressing  Flowsheets (Taken 1/9/2024 0800)  Absence of seizures: Monitor for seizure activity.  If seizure occurs, document type and location of movements and any associated apnea     Problem: Respiratory - Adult  Goal: Achieves optimal ventilation and oxygenation  1/9/2024 0809 by ANASTASIA POLLARD  Outcome: Progressing  Flowsheets  Taken 1/9/2024 0800 by LATRICE 
  Problem: Discharge Planning  Goal: Discharge to home or other facility with appropriate resources  Outcome: Progressing  Flowsheets (Taken 1/7/2024 1910)  Discharge to home or other facility with appropriate resources: Arrange for needed discharge resources and transportation as appropriate     Problem: Pain  Goal: Verbalizes/displays adequate comfort level or baseline comfort level  Outcome: Progressing     Problem: ABCDS Injury Assessment  Goal: Absence of physical injury  Outcome: Progressing  Flowsheets (Taken 1/7/2024 1944)  Absence of Physical Injury: Implement safety measures based on patient assessment     Problem: Neurosensory - Adult  Goal: Achieves stable or improved neurological status  Outcome: Progressing  Goal: Absence of seizures  Outcome: Progressing     Problem: Respiratory - Adult  Goal: Achieves optimal ventilation and oxygenation  Outcome: Progressing     Problem: Cardiovascular - Adult  Goal: Maintains optimal cardiac output and hemodynamic stability  Outcome: Progressing     Problem: Skin/Tissue Integrity - Adult  Goal: Skin integrity remains intact  Outcome: Progressing     Problem: Musculoskeletal - Adult  Goal: Return mobility to safest level of function  Outcome: Progressing     Problem: Gastrointestinal - Adult  Goal: Minimal or absence of nausea and vomiting  Outcome: Progressing     Problem: Genitourinary - Adult  Goal: Absence of urinary retention  Outcome: Progressing     Problem: Infection - Adult  Goal: Absence of infection at discharge  Outcome: Progressing     Problem: Metabolic/Fluid and Electrolytes - Adult  Goal: Electrolytes maintained within normal limits  Outcome: Progressing  Goal: Hemodynamic stability and optimal renal function maintained  Outcome: Progressing     
Brief note:    47yo woman with sinus infection one week ago presented to ER with left-sided headache and visual changes/loss on the right and found to have left transverse venous sinus thrombosis and small associated area of left occipital hemorrhage.  She has been started on heparin gtt and has had two therapeutic anti-Xa levels. Repeat HCT ordered.     Currently, patient is afebrile with hemodynamically stable vital signs. She is complaining of a 3/10 h/a on the right side, when she states she normally feels it on the left. She has no other associated symptoms with her h/a.         PHYSICAL EXAM:  Vitals:    01/08/24 1500 01/08/24 1600 01/08/24 1700 01/08/24 1900   BP:  127/76 131/75 114/80   Pulse: 92 94 99 84   Resp: 19 23 23 15   Temp:  98.6 °F (37 °C)     TempSrc:  Oral     SpO2: 99% 99% 97%    Weight:       Height:             General: Alert, no distress, well-nourished  Neurologic  Mental status:   orientation to person, place, time, situation   Attention intact as able to attend well to the exam     Language fluent in conversation   Comprehension intact; follows simple commands    GCS 15    Cranial nerves:   CN2: Visual fields full w/o extinction on confrontational testing   CN 3,4,6: Pupils equal and reactive to light, extraocular muscles intact  CN5: Facial sensation symmetric   CN7: Face symmetric  CN8: Hearing symmetric to spoken voice  CN9: Palate elevated symmetrically  CN11: Traps full strength on shoulder shrug  CN12: Tongue midline with protrusion    Motor Exam:   R  L    Deltoid 5  5   Biceps 5 5   Triceps 5 5   Wrist extension  5 5   Interossei 5 5      R  L    Hip flexion  5  5   Hip extension  5 5   Knee flexion  5 5   Knee extension  5 5   Ankle dorsiflexion  5 5   Ankle plantar flexion  5 5     Sensory: light touch intact and symmetric in all 4 extremities.  No sensory extinction on bilateral simultaneous stimulation  Cerebellar/coordination: finger nose finger normal without ataxia  Tone: 
1/10/2024 0053)  Minimal or absence of nausea and vomiting: Administer ordered antiemetic medications as needed     Problem: Genitourinary - Adult  Goal: Absence of urinary retention  Outcome: Progressing  Flowsheets (Taken 1/10/2024 0053)  Absence of urinary retention: Assess patient’s ability to void and empty bladder     Problem: Infection - Adult  Goal: Absence of infection at discharge  Outcome: Progressing  Flowsheets (Taken 1/10/2024 0053)  Absence of infection at discharge: Assess and monitor for signs and symptoms of infection     Problem: Metabolic/Fluid and Electrolytes - Adult  Goal: Electrolytes maintained within normal limits  Outcome: Progressing  Flowsheets (Taken 1/10/2024 0053)  Electrolytes maintained within normal limits: Monitor labs and assess patient for signs and symptoms of electrolyte imbalances     Problem: Metabolic/Fluid and Electrolytes - Adult  Goal: Hemodynamic stability and optimal renal function maintained  Outcome: Progressing  Flowsheets (Taken 1/10/2024 0053)  Hemodynamic stability and optimal renal function maintained: Monitor intake, output and patient weight     Problem: Safety - Adult  Goal: Free from fall injury  Outcome: Progressing  Flowsheets (Taken 1/10/2024 0053)  Free From Fall Injury:   Instruct family/caregiver on patient safety   Based on caregiver fall risk screen, instruct family/caregiver to ask for assistance with transferring infant if caregiver noted to have fall risk factors     
Safety - Adult  Goal: Free from fall injury  1/10/2024 0840 by Alexis Bowers, RN  Outcome: Progressing     
appearance without deformity. No swelling or erythema.     
stability and activity tolerance for standing, transferring and ambulating with or without assistive devices     Problem: Gastrointestinal - Adult  Goal: Minimal or absence of nausea and vomiting  Outcome: Progressing  Flowsheets (Taken 1/11/2024 0011)  Minimal or absence of nausea and vomiting: Administer ordered antiemetic medications as needed     Problem: Genitourinary - Adult  Goal: Absence of urinary retention  Outcome: Progressing  Flowsheets (Taken 1/11/2024 0011)  Absence of urinary retention: Assess patient’s ability to void and empty bladder     Problem: Infection - Adult  Goal: Absence of infection at discharge  Outcome: Progressing  Flowsheets (Taken 1/11/2024 0011)  Absence of infection at discharge: Assess and monitor for signs and symptoms of infection     Problem: Metabolic/Fluid and Electrolytes - Adult  Goal: Electrolytes maintained within normal limits  Outcome: Progressing  Flowsheets (Taken 1/11/2024 0011)  Electrolytes maintained within normal limits: Monitor labs and assess patient for signs and symptoms of electrolyte imbalances     Problem: Metabolic/Fluid and Electrolytes - Adult  Goal: Hemodynamic stability and optimal renal function maintained  Outcome: Progressing  Flowsheets (Taken 1/11/2024 0011)  Hemodynamic stability and optimal renal function maintained: Monitor intake, output and patient weight     Problem: Safety - Adult  Goal: Free from fall injury  Outcome: Progressing  Flowsheets (Taken 1/11/2024 0011)  Free From Fall Injury:   Instruct family/caregiver on patient safety   Based on caregiver fall risk screen, instruct family/caregiver to ask for assistance with transferring infant if caregiver noted to have fall risk factors     
needed   Assess patient’s ability to void and empty bladder     Problem: Infection - Adult  Goal: Absence of infection at discharge  1/7/2024 2121 by Danisha Marrufo RN  Outcome: Progressing  Flowsheets (Taken 1/7/2024 2000)  Absence of infection at discharge:   Assess and monitor for signs and symptoms of infection   Monitor lab/diagnostic results   Monitor all insertion sites i.e., indwelling lines, tubes and drains     Problem: Metabolic/Fluid and Electrolytes - Adult  Goal: Electrolytes maintained within normal limits  1/7/2024 2121 by Danisha Marrufo RN  Outcome: Progressing  Flowsheets (Taken 1/7/2024 2000)  Electrolytes maintained within normal limits:   Monitor labs and assess patient for signs and symptoms of electrolyte imbalances   Administer electrolyte replacement as ordered     Problem: Metabolic/Fluid and Electrolytes - Adult  Goal: Hemodynamic stability and optimal renal function maintained  1/7/2024 2121 by Danisha Marrufo RN  Outcome: Progressing  Flowsheets (Taken 1/7/2024 2000)  Hemodynamic stability and optimal renal function maintained:   Monitor labs and assess for signs and symptoms of volume excess or deficit   Monitor intake, output and patient weight     Problem: Safety - Adult  Goal: Free from fall injury  Outcome: Progressing  Flowsheets (Taken 1/7/2024 2110)  Free From Fall Injury: Instruct family/caregiver on patient safety

## 2024-01-11 NOTE — DISCHARGE SUMMARY
tolerated    Disposition: home  Discharged Condition: Stable  Follow Up: Primary Care Physician in one week    Total time spent on discharge, finalizing medications, referrals and arranging outpatient follow up was more than 30 minutes    Thank you Traci Cantu for the opportunity to be involved in this patients care. If you have any questions or concerns please feel free to contact me at [phone].

## 2024-01-11 NOTE — PROGRESS NOTES
ONCOLOGY HEMATOLOGY CARE PROGRESS NOTE      SUBJECTIVE:      ROS:     Constitutional:  No weight loss, No fever, No chills, No night sweats.  Energy level good.  Eyes:  No impairment or change in vision  Cardiovascular:  Positive for leg swelling.   Genitourinary:  Positive for menstrual problem and vaginal bleeding.      OBJECTIVE        Physical    VITALS:  Patient Vitals for the past 24 hrs:   BP Temp Temp src Pulse Resp SpO2   01/10/24 0700 -- -- -- 66 18 --   01/10/24 0400 123/65 97.9 °F (36.6 °C) Oral 74 14 96 %   01/10/24 0000 116/67 99.1 °F (37.3 °C) Oral 75 17 97 %   01/09/24 2000 127/75 97.6 °F (36.4 °C) Oral 85 19 98 %   01/09/24 1700 131/79 -- -- 93 17 97 %   01/09/24 1600 124/85 98.5 °F (36.9 °C) Oral 87 13 99 %   01/09/24 1500 132/74 -- -- 78 21 99 %   01/09/24 1400 135/77 -- -- 95 18 98 %   01/09/24 1200 (!) 119/58 97.9 °F (36.6 °C) Oral 81 19 97 %   01/09/24 1000 (!) 113/53 -- -- 84 19 --   01/09/24 0900 120/82 -- -- 76 18 --   01/09/24 0800 118/63 -- -- 83 17 96 %       24HR INTAKE/OUTPUT:    Intake/Output Summary (Last 24 hours) at 1/10/2024 0732  Last data filed at 1/10/2024 0515  Gross per 24 hour   Intake 600 ml   Output --   Net 600 ml       CONSTITUTIONAL: awake, alert, cooperative,   EYES: pupils equal, round and reactive to light, sclera clear and conjunctiva normal  ENT: Normocephalic, without obvious abnormality, atraumatic    LUNGS: no increased work of breathing and clear to auscultation   CARDIOVASCULAR: regular rate and rhythm, normal S1 and S2, no murmur noted  ABDOMEN: normal bowel sounds x 4, soft, non-distended, non-tender, no masses palpated, no hepatosplenomgaly   MUSCULOSKELETAL: full range of motion noted, tone is normal  NEUROLOGIC: awake, alert, oriented to name, place and time. Motor skills grossly intact.       DATA:  CBC:    Recent Labs     01/10/24  0330 01/09/24  0437 01/08/24  0338 01/07/24  2143 01/07/24  1457   WBC 6.7 6.7 7.4 8.8 
       NEUROCRITICAL CARE PROGRESS NOTE       Patient Name: Saba Espinoza YOB: 1975   Sex: Female Age: 48 yrs     CC / Reason for Consult: venous sinus thrombosis     Changes over last 24 hours:   -Slightly subtherapeutic on heparin this morning   -Blood pressures stable  -Blood cultures NGTD, no fevers   -Hematology evaluated 1/9, found to be iron deficient, receiving supplementation. Tells me iron and B12 really helped her headache.   -No headache but feels foggy this morning, otherwise exam is non focal    ROS: Feels foggy this morning     ASSESSMENT & RECOMMENDATIONS   Assessment:  -49yo woman with a sinus infection one week ago presented to ER with left-sided headache and visual changes/loss on the right and found to have left transverse venous sinus thrombosis and small associated area of left occipital hemorrhage. She was recently started on progesterone per her OBGYN for heavy menstrual bleeding.     -In this above context, consider that this venous thrombosis was provoked in the setting or hormone therapy and recent viral infection though hypercoagulable disorder will need to be rule out.     Plan:  -Administer first dose of Eliquis 5mg BID this afternoon and continue until follow up with neurology outpatient in 3 months.  -Stop heparin drip 2 hours after administration of Eliquis   -Appreciate input from hematology, note reviewed, s/p Iron and B12 administration. Plan for outpatient follow up/  -No seizure prophylaxis indicated at this time   -She should have visual field testing with opthalmology prior to driving again given location of blood. We discussed this in depth with her  at the bedside.  -Okay to discharge later this afternoon from our standpoint after heparin drip is turned off.    Above case and plan discussed with Dr. Walter and Dr. Sotomayor.     Rashad Velazquez, APRN - CNP   Neurocritical Care   1/11/2024 11:38 AM  [facility]: [facility] Neurocritical 
       NEUROCRITICAL CARE PROGRESS NOTE       Patient Name: Saba Espinoza YOB: 1975   Sex: Female Age: 48 yrs     CC / Reason for Consult: venous sinus thrombosis     Changes over last 24 hours:   -Slightly supratherapeutic on heparin gtt this AM, repeat antiXa now therapeutic on slightly lower dose  -Blood pressures well controlled   -Blood cx NGTD  -Hematology evaluated 1/9  -no headache this AM, reports feeling \"foggy\"    ROS: feels foggy this morning    ASSESSMENT & RECOMMENDATIONS   Assessment:  -49yo woman with a sinus infection one week ago presented to ER with left-sided headache and visual changes/loss on the right and found to have left transverse venous sinus thrombosis and small associated area of left occipital hemorrhage. She was recently started on progesterone per her OBGYN for heavy menstrual bleeding.     -She has been started on heparin gtt and has been therapeutic since 10Am on 1/8    Plan:  -Continue heparin gtt with anti-Xa goal between 0.3 and 0.5  -Repeat CTV ordered for 10AM on 1/11  -Prothrombin gene in process, Factor VIII elevated, otherwise hypercoaguable work up unremarkable thus far  -Appreciate input from hematology, note reviewed, s/p Iron and B12 administration  -Okay for Q4 hour neuro checks and to go to 5T  -No seizure prophylaxis indicated at this time  - She should have visual field testing with opthalmology prior to driving again given location of blood  -We will continue to follow     Above case and plan was discussed with Dr. Walter, ICU team, patient and patient's      AMPARO Gonzales - CNP   Neurocritical Care   1/10/2024 11:02 AM  [facility]: [facility] Neurocritical Care  [phone]  HISTORY   Interval History: has been therapeutic on heparin since yesterday morning. Exam has stayed stable.    PMH History reviewed. No pertinent past medical history.   Allergies No Known Allergies   Diet ADULT DIET; Regular   Isolation No active isolations 
       NEUROCRITICAL CARE PROGRESS NOTE       Patient Name: Saba Espinoza YOB: 1975   Sex: Female Age: 48 yrs     CC / Reason for Consult: venous sinus thrombosis     Changes over last 24 hours:   -Therapeutic on heparin gtt  -Repeat head CT is stable  -Blood pressures well controlled   -Blood cx NGTD  -Hem/onc seeing patient this morning  -headache improved this morning,     ROS: Mild generalized headache, felt foggy this morning    ASSESSMENT & RECOMMENDATIONS   Assessment:  -49yo woman with a sinus infection one week ago presented to ER with left-sided headache and visual changes/loss on the right and found to have left transverse venous sinus thrombosis and small associated area of left occipital hemorrhage. She was recently started on progesterone per her OBGYN for heavy menstrual bleeding.     -She has been started on heparin gtt and has been therapeutic since 10Am on 1/8    Plan:  -Continue heparin gtt with anti-Xa goal between 0.3 and 0.5  -Repeat CTV ordered for 10AM on 1/11  -Follow up on er coag panel  -Appreciate input from hematology, note reviewed   -Okay for Q4 hour neuro checks and to go to 5T  -No seizure prophylaxis indicated at this time  -We will continue to follow     Above case and plan was discussed with Dr. Walter, ICU team, patient and patient's      AMPARO Tang - CNP   Neurocritical Care   1/9/2024 8:48 AM  [facility]: [facility] Neurocritical Care  [phone]  HISTORY   Interval History: has been therapeutic on heparin since yesterday morning. Exam has stayed stable.    PMH History reviewed. No pertinent past medical history.   Allergies No Known Allergies   Diet ADULT DIET; Regular   Isolation No active isolations     CURRENT SCHEDULED MEDICATIONS   Inpatient Medications     fluorescein, 1 strip, Left Eye, Once    sodium chloride flush, 5-40 mL, IntraVENous, 2 times per day   Infusions    sodium chloride      heparin (PORCINE) Infusion 15 
     ICU Progress Note    Admit Date: 1/7/2024  Day: 1  IV Access:Peripheral R Antecubital   IV Fluids:None  Vasopressors:None                Antibiotics: None  Diet: ADULT DIET; Regular    CC: Doing well. Continues to report L-sided retro-orbital HA, but shares this is improved from before. Reports R-vision changes have completely resolved.     Interval history: Vitals stable, satting well on RA, BP 100s-120s/60s-80s. BMP NL. CBC significant for Hb 10.5 (11 and 10.7 previous readings) with low MCV & high RDW.     HPI: 49yo F w/no PMH who presents with L-sided retro-orbital HA since 2 AM last night along with vision changes in R eye. Woke up due to this pain but went back to bed. When she woke up later in the morning, symptoms were worse and also associated with nausea which prompted her to go to the ED. Describes L-sided retro-orbital pain as stabbing and says she sees haloes and circular lights in R eye. Did have a sinu infection which resolved a few days ago. Denies fevers/chills/vomiting/AP/LOC/dizziness/lightheadedness. No meds at home except norethindrone 10 mg prescribed last November by gynecologist due to heavy menstrual bleeding. At presentation to ED, , no neuro deficits, imaging concerning for L parieto-occipital IPH and CTA with concerns for transverse sinus thrombosis. Transferred to [facility] for further management.     CTA venous head w wo contrast: L occipital hemorrhage, L transverse sinus thrombosis     CTA H&N: no hemodynamically significant arterial stenosis, unremarkable [place] and proximal cerebral arterial vessels, nonfilling L transferse sinus with possible filing defect, L occipital lobe intraparenchymal hemorrhage    Medications:     Scheduled Meds:   fluorescein  1 strip Left Eye Once    sodium chloride flush  5-40 mL IntraVENous 2 times per day     Continuous Infusions:   sodium chloride      heparin (PORCINE) Infusion 15 Units/kg/hr (01/08/24 0500)     PRN Meds:sodium 
    Neurology Progress Note    Patient: Saba Espinoza MRN: [id number]    YOB: 1975  Age: 48 y.o.  Sex: female   Unit: [facility] ICU TOWER Room/Bed: 4501/4501-01 Location: [facility]    Today's Date: 1/8/2024  Date of Admission: 1/7/2024  2:11 PM  Admitting Physician: BERNICE NGUYEN    Primary Care Physician: Traci Anaya          LOS: 1 day      ASSESSMENT & RECOMMENDATIONS     Assessment  47yo woman with sinus infection one week ago presented to ER with left-sided headache and visual changes/loss on the right and found to have left transverse venous sinus thrombosis and small associated area of left occipital hemorrhage.   She has been started on heparin gtt and became therapeutic on it just this morning    Recommendations  Continue heparin gtt  Repeat head CT after next (second) therapeutic anti-Xa  Hypercoag panel sent  Will need Heme/Onc evaluation for causes of hypercoagulability [ordered]      SUBJECTIVE     Chart reviewed, events noted, pt seen & examined. No acute events overnight. Afebrile. Pt presented to ER yesterday with worsening headache on left side.    Per my interview with pt:  She had a sinus infection about a week ago. She had recovered well but yesterday developed left side pain around her eye and also noted a ring of light around in her field of vision. When these symptoms persisted and then worsened, she presented to the ER.    Head CT revealed a small left occipital hemorrhage and CTV ultimately demonstrated a left transverse sinus thrombosis. She has been on progesterone for menorrhagia.     Currently, she feels well. She feels that her vision difficulties have resolved. She states that initially, in addition to the ring of light in her vision, she was also having difficulty seeing letters of words on the right side. This has resolved as well. She essentially feels to her baseline.    Review of Systems  No changes since pt last seen other than those 
   01/10/24 1334   Encounter Summary   Encounter Overview/Reason  Follow-up   Service Provided For: Patient   Referral/Consult From: Rounding   Support System Unknown   Last Encounter  01/10/24   Complexity of Encounter Low   Begin Time 1315   End Time  1320   Total Time Calculated 5 min   Assessment/Intervention/Outcome   Outcome Refused/Declined      Intern ALEXY Fox  
  Current NIHSS 0    Nursing Core Measures for Stroke:   [x]   Education template documentation (STROKE/TIA). Please select only risk factors that are applicable to patient when selecting risk factors.  [x]   Care Plan template documentation (Physiologic Instability - Neurosensory). Selecting this will add care plan rows to the flowsheet under the Neuro section of Head to Toe.  [x]   Verified Swallow Screen completed prior to PO intake of food, drink, medications>          Please verify correct medication route prior to administration for intubated patients, patients who can not swallow or have alternative routes of intake (NG, OG, IN), etc  [x]   VTE Prophylaxis: SCDs ordered/addressed; SCDs: On           (As a reminder, ASA, Plavix, and TPA/TNK are not VTE prophylaxis.)    Reviewed the Following Education with Patient and/or Family:   - Personalized risk factors for patient, along with changes, modifications that will help prevent stroke.  - Signs and Symptoms of Stroke: (Facial droop, weakness/numbness especially on one side, speech difficulty, sudden confusion, sudden loss of vision, sudden severe headache, sudden loss of balance or having difficulty walking, syncope, or seizure)  - How to activate EMS (911)   - Importance of Follow Up Appointments at Discharge   - Importance of Compliance with Medications Prescribed at Discharge  - Available community resources and stroke advocacy groups if needed    Patient and/or family member: verbalized understanding.     Stroke Education booklet given to patient/family (or verified, if given already), which reviews above information. yes         Electronically signed by ANASTASIA POLLARD RN on 1/8/2024 at 6:01 PM   
  Current NIHSS 0    Nursing Core Measures for Stroke:   [x]   Education template documentation (STROKE/TIA). Please select only risk factors that are applicable to patient when selecting risk factors.  [x]   Care Plan template documentation (Physiologic Instability - Neurosensory). Selecting this will add care plan rows to the flowsheet under the Neuro section of Head to Toe.  [x]   Verified Swallow Screen completed prior to PO intake of food, drink, medications>          Please verify correct medication route prior to administration for intubated patients, patients who can not swallow or have alternative routes of intake (NG, OG, [place]), etc  [x]   VTE Prophylaxis: SCDs ordered/addressed; SCDs: N/A Heparin           (As a reminder, ASA, Plavix, and TPA/TNK are not VTE prophylaxis.)    Reviewed the Following Education with Patient and/or Family:   - Personalized risk factors for patient, along with changes, modifications that will help prevent stroke.  - Signs and Symptoms of Stroke: (Facial droop, weakness/numbness especially on one side, speech difficulty, sudden confusion, sudden loss of vision, sudden severe headache, sudden loss of balance or having difficulty walking, syncope, or seizure)  - How to activate EMS (911)   - Importance of Follow Up Appointments at Discharge   - Importance of Compliance with Medications Prescribed at Discharge  - Available community resources and stroke advocacy groups if needed    Patient and/or family member: verbalized understanding.     Stroke Education booklet given to patient/family (or verified, if given already), which reviews above information. yes         Electronically signed by Danisha Marrufo RN on 1/8/2024 at 2:33 AM   
  Current NIHSS 0    Nursing Core Measures for Stroke:   [x]   Education template documentation (STROKE/TIA). Please select only risk factors that are applicable to patient when selecting risk factors.  [x]   Care Plan template documentation (Physiologic Instability - Neurosensory). Selecting this will add care plan rows to the flowsheet under the Neuro section of Head to Toe.  [x]   Verified Swallow Screen completed prior to PO intake of food, drink, medications>          Please verify correct medication route prior to administration for intubated patients, patients who can not swallow or have alternative routes of intake (NG, OG, [place]), etc  [x]   VTE Prophylaxis: SCDs ordered/addressed; SCDs: N/A Heparin           (As a reminder, ASA, Plavix, and TPA/TNK are not VTE prophylaxis.)    Reviewed the Following Education with Patient and/or Family:   - Personalized risk factors for patient, along with changes, modifications that will help prevent stroke.  - Signs and Symptoms of Stroke: (Facial droop, weakness/numbness especially on one side, speech difficulty, sudden confusion, sudden loss of vision, sudden severe headache, sudden loss of balance or having difficulty walking, syncope, or seizure)  - How to activate EMS (911)   - Importance of Follow Up Appointments at Discharge   - Importance of Compliance with Medications Prescribed at Discharge  - Available community resources and stroke advocacy groups if needed    Patient and/or family member: verbalized understanding.     Stroke Education booklet given to patient/family (or verified, if given already), which reviews above information. yes         Electronically signed by Danisha Marrufo RN on 1/8/2024 at 7:31 PM   
  Hospital Medicine Progress Note      Date of Admission: 1/7/2024  Hospital Day: 2    Chief Admission Complaint: Left-sided headache, right field visual defect     Subjective: Patient was seen and evaluated at bedside.  Reported improvement in headache and visual field defects    Presenting Admission History:       48-year-old female with no significant past medical history who presented to the ER with complaints of left-sided headache that started on the day of presentation.  Patient had woken up because of the headache.  And symptoms were associated with nausea.  In the ER patient's blood pressure slightly elevated in the 150s.  Patient did not have any focal neurological deficits.  CT of the head showed evidence of intraparenchymal occipital hemorrhage and subarachnoid blood in the left parietal and occipital area.  CT of the head and neck was negative for LVO.  CTA venous showed evidence of left occipital hemorrhage and left transverse venous sinus thrombosis.  Case was discussed with neurosurgery and patient was transferred to the [facility] for further management.    Assessment/Plan:      Current Principal Problem:  Intracranial hemorrhage (HCC)    Left occipital intraparenchymal hemorrhage/left transverse venous anastomosis/headache    Plan:  *Neurocritical care/neurosurgery consulted  Every 1 hour neurochecks  Hypercoagulability workup/hematology/oncology evaluation  Target SBP less than 160, as needed labetalol  On heparin by weight for cerebral venous thrombosis  CT head after 2 therapeutic antifactor Xa levels  Diet has been advanced per speech  No neurosurgical intervention  No PT recommendations      Physical Exam Performed:      General: No distress, alert and oriented x3,  Cardiac: S1 plus S2 regular rate and rhythm, no murmurs rubs or gallops  Respiratory: No wheeze or crackles appreciated, equal air entry bilaterally  GI/: Abdomen soft, nontender, bowel sounds audible  Skin: No rashes or 
-The [facility] Internal Medicine-  -ICU to [place] Transfer Note-  HPI from H&P  Patient is a 48 years old female with no past medical history who presented with left-sided retro-orbital headache since 2 AM last night along with some vision changes in the right eye.  Patient woke up due to this pain but then went back to bed and woke up later in the morning when it was worse and also associated with nausea when she decided to go to the ED for further evaluation.  She describes the left-sided retro-orbital pain as stabbing and she noticed hallows and circular lights in her right eye.  Patient did have some sinus infection few days back which resolved.  She denies of any fevers, chills, vomiting, abdominal pain, loss of consciousness, dizziness or lightheadedness.  She denies of taking any medications at home except for norethindrone 10 mg  which she was prescribed by her gynecologist for heavy menstrual bleeding in November.  She follows up with her primary care doctor with last visit being about an year ago.   On arrival to the ED patient was in no acute distress with blood pressure systolic 150, NIH stroke scale 0 with no neurodeficits, imaging showed concerns for a left parieto-occipital intraparenchymal hemorrhage and CTA with concerns for transverse sinus venous thrombosis.  Patient was transferred to the [facility] for further evaluation and treatment.    I  ICU Admission Reason & Brief ICU Course:   Anti-Xa therapeutic and subsequent repeat/stability CT scan stable. Neuro checks decreased to Q2H. Fe deficiency anemia - will need outpatient GI workup.        C  Code Status/DPOA Info/Goals of Care/ACP Note:   -Code Status Full  -POA/ACP documentation?: no  -Any changes to goals of care? no  -is palliative care consulted? no      U Unprescribing & Pertinent High-Risk Medications  -Anticoagulation:  Patient on heparin    -Antibiotics:  N/A - no current planned antimicrobials   -[] GI PPX : no    P Pending 
4 Eyes Admission Assessment     I agree as the admission nurse that 2 RN's have performed a thorough Head to Toe Skin Assessment on the patient. ALL assessment sites listed below have been assessed on admission.       Areas assessed by both nurses: ***  [x]   Head, Face, and Ears      Shoulders, Back, and Chest  [x][x]   Arms, Elbows, and Hands   [x]   Coccyx, Sacrum, and Ischium  [x]   Legs, Feet, and Heels        Does the Patient have Skin Breakdown?  No         Abdi Prevention initiated:  No   Wound Care Orders initiated:  No      [facility] nurse consulted for Pressure Injury (Stage 3,4, Unstageable, DTI, NWPT, and Complex wounds) or Abdi score 18 or lower:  No      Nurse 1 eSignature: Electronically signed by ANASTASIA POLLARD RN on 1/8/24 at 5:56 PM EST    **SHARE this note so that the co-signing nurse is able to place an eSignature**    Nurse 2 eSignature: {Esignature:[id number]}    
Clinical Pharmacy Progress Note  Medication History     Admit Date: 1/7/2024    List of of current medications patient is taking is complete. Home Medication list in EPIC updated to reflect changes noted below.    Source of information: patient interview, pharmacy fills    Patient's home pharmacy: [facility]     Changes made to medication list:   Medications removed:   Ondansetron prn  Dicyclomine 10mg prn   Medications added:   Norethindrone  Adderall - pt states she only takes this on work days; likely will not need while here.    Vit D OTC  MVI OTC      Current Outpatient Medications   Medication Instructions    amphetamine-dextroamphetamine (ADDERALL) 10 MG tablet 25 mg, Oral, DAILY, Takes on work days<BR>Patient fills 10mg and 15mg tablets to create dosage of 25mg    Multiple Vitamins-Minerals (THERAPEUTIC MULTIVITAMIN-MINERALS) tablet 1 tablet, Oral, DAILY WITH LUNCH    norethindrone (AYGESTIN) 10 mg, Oral, EVERY EVENING    vitamin D 2,000 Units, Oral, DAILY WITH LUNCH       Please call with any questions.  Nimco GrissomD., BCPS   1/9/2024 3:23 PM  Wireless: [phone]        
Heparin drip subtherapeutic with axti-XA at 0.22. Drip increased by 2 units/kg/hr and new bag started at 0626. Heparin drip infusing at 15 units/kg/hr. Double checked with Dennys Wallace RN. Repeat anti-XA at 1200. Will continue to monitor.   
Mare NP at bedside for neuro assessment. Pt c/o of headache on right side (compared to previous headaches behind L eye). Pt denies vision changes or dizziness. No new orders at this time.   
Neuro critical care NP Arslan at bedside for assessment.   
New bag of heparin hung, pt pain controlled with tylenol, Venous doppler complete.  
Patient seen and examined  Has minimal discomfort no headache or visual disturbance  Deneis nausea or vomiting  Able to tolerate po  No signs of bleeding  Has constipation however  Has not had bm since Saturday   Vitals:    01/10/24 1800   BP:    Pulse: 92   Resp: 24   Temp:    SpO2:       Gen: pleasant alert oriented  Neuro: no obvious deficits noted  Chest: normal inspiratory expiratory effort  Cvs: s2s1 no murmurs  Abd: soft nd nt bs normal active  Ext; no edema positive pulses    Sodium 139 k is 3.9    Sugar 106    49 yo female admitted with Left occipital IPH most likely 2/2 transverse sinus thrombosis   She has no visual deficits  She has no aphasia   Continue heparin infusion  ? Trigger related to progesterone which she received recently  Continue with monitoring per neuro and icu team will follow along  For constipation added miralax and also colace and suppository.   
Patient seen and examined  Has minimal discomfort no headache or visual disturbance  Deneis nausea or vomiting  Able to tolerate po  No signs of bleeding  Vitals:    01/09/24 1200   BP: (!) 119/58   Pulse: 81   Resp: 19   Temp: 97.9 °F (36.6 °C)   SpO2: 97%      Gen: pleasant alert oriented  Neuro: no obvious deficits noted  Chest: normal inspiratory expiratory effort  Cvs: s2s1 no murmurs  Abd: soft nd nt bs normal active  Ext; no edema positive pulses    Sodium 139 k is 3.9    Sugar 106    49 yo female admitted with Left occipital IPH most likely 2/2 transverse sinus thrombosis   She has no visual deficits  She has no aphasia   Continue heparin infusion  ? Trigger related to progesterone which she received recently  Continue with monitoring per neuro and icu team will follow along  
Physical /Occupational Therapy    Orders received, chart reviewed. Pt with bedrest orders currently, will hold evaluations until new activity orders are placed.     Addendum 9:09: MD recommending hold and perform therapy evals 1/9.     Bell Aparicio, PT, DPT  Tisha Rosario, OT     
Pt a/ox4, reports 3/10 headache, no neuro deficits, up to bathroom, VSS, standard safety precautions in place.  
Pt down to CT with this RN. No complications. VSS. Pt back in rm 4501; bed alarm set, bed in the lowest position, and wheels locked. Call light and bedside table within reach.   
Pt in bed, no neuro changes, saw SLP, A/ox4, reports mild headache but does not want tylenol. VSS  
Pt to CT with this RN in wheelchair. VSS. No complications during transfer. Pt back to room 4501; bed in the lowest position, alarm set, and wheels locked.   
Pt up to unit, bedside report given to night shift RN. Handoff neuro assessment complete.  
Layout: Two level, Bed/Bath upstairs, 1/2 bath on main level  Home Access: Stairs to enter with rails  Entrance Stairs - Number of Steps: 4 with rail + 4 without  Bathroom Shower/Tub: Tub/Shower unit  Bathroom Toilet: Standard  Home Equipment: None  Has the patient had two or more falls in the past year or any fall with injury in the past year?: No  ADL Assistance: Independent  Homemaking Assistance: Independent  Homemaking Responsibilities: Yes  Ambulation Assistance: Independent  Transfer Assistance: Independent  Active : Yes  Occupation: Full time employment  Type of Occupation: speech pathologist  Leisure & Hobbies: walking, read, watch TV, craft  Additional Comments:  works full time       Objective   Pulse: 84  Heart Rate Source: Monitor  BP: 118/73  BP Location: Right upper arm  BP Method: Automatic  Patient Position: Semi fowlers  MAP (Calculated): 88  Respirations: 18  SpO2: 96 %  O2 Device: None (Room air)  Temp: 98.2 °F (36.8 °C)             Safety Devices  Type of Devices: Call light within reach;Chair alarm in place;Gait belt;Nurse notified;Left in chair  Bed Mobility Training  Bed Mobility Training: No  Balance  Sitting: Intact  Standing: Intact  Transfer Training  Transfer Training: Yes  Overall Level of Assistance: Independent  Sit to Stand: Independent  Stand to Sit: Independent  Stand Pivot Transfers: Independent  Bed to Chair: Independent  Toilet Transfer: Independent (standard commode)     AROM: Within functional limits  Strength: Within functional limits  Coordination: Within functional limits  Tone: Normal  Sensation: Intact  ADL  Feeding: Independent  Grooming: Independent  Grooming Skilled Clinical Factors: in stance  UE Bathing: Independent  LE Bathing: Independent  LE Bathing Skilled Clinical Factors: educated on possible need for sup/assist  UE Dressing: Independent  LE Dressing: Independent  Toileting: Independent  Functional Mobility: Independent  Functional Mobility Skilled 
hemodynamically significant arterial stenosis.   2.Unremarkable [place] of tate and proximal cerebral arterial vessels.   3. Nonfilling left transverse sinus with possible filling defect      4. Left occipital lobe intraparenchymal hemorrhage.      Electronically signed by Osiris Frye      CT HEAD WO CONTRAST   Final Result   Intraparenchymal occipital hemorrhage and subarachnoid blood left parietal and occipital.      Findings were relayed to the Emergency Room 3 50 7:00 PM 1/7/2024.      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  Pt able to cough and dry swallow on command. Pt analyzed with trials of ice chips, water by cup and straw, applesauce and cracker . Pt had no difficulty closing lips around spoon or drawing liquid up a straw.  Pt had no difficulty with mastication with solids.  There was no anterior spillage or oral residue noted with any consistency.Pt demonstrated no s/s aspiration with any consistency presented.   Pt able to initiate swallow without difficulty with laryngeal movement observed.   Vocal quality remained clear throughout.  No coughing, throat clearing or choking observed with any consistency. Pt consumed 3oz water uninterrupted by cup/straw without difficulty or s/s aspiration.      Instrumental 
  Final Result   Intraparenchymal occipital hemorrhage and subarachnoid blood left parietal and occipital.      Findings were relayed to the Emergency Room 3 50 7:00 PM 1/7/2024.      Electronically signed by Osiris Frye            Assessment & Plan   Saba Espinoza is a 48 y.o. female, with no past medical history who presented with left-sided retro-orbital headache along with some vision changes in the right eye.  Diagnosed with Left occipital IPH most likely 2/2 transverse sinus thrombosis.     Neuro  L occipital IPH most likely 2/2 transverse sinus thrombosis  - See imaging findings above  - Neuro c/s: Q1 neurochecks, SBP < 160 w/PRN Labetalol ordered, heparin gtt, anti-Xa Q6H NL x 2 with subsequent repeat non-con head CT stable, Q2H neurochecks   - PT/OT/ST   - FLP Cholesterol 129, NL cholesterol & TG. A1C pending.     Pulmonary  No concerns      Cardiovascular  No concerns      GI  No concerns     Constipation  No concerns     Renal   No concerns       No concerns     Heme  - Hb 10.8, w/low MCV and high RDW   - Ddx: Fe deficiency anemia due to anemia of chronic disease vs heavy menstruation     Infectious   - Blood cx x 2 NGTD     DVT PPx: Heparin   Diet: ADULT DIET; Regular   Code status:  Full Code   _____________________  Randell Mike MD   Internal Medicine Resident, PGY-1    Patient was seen, examined and discussed with Dr. Mike. I agree with the interval history. My physical exam confirms the findings listed below  Chart was reviewed including labs and medical records confirm the findings noted    Peripheral IV 01/07/24 Right Antecubital (Active)   Number of days: 1     Venous sinus thrombosis with subarachnoid and intraparenchymal hemorrhage.    Headache is better but not resolved.  Vision is normal now.    Transient hypertension - resolved.   SHAGGY     Heparin drip  Frequent neurochecks     
Independent (from recliner and toilet without AD)  Stand to Sit: Independent  Ambulation  Surface: Level tile  Device: No Device  Assistance: Independent  Quality of Gait: steady gait, baseline  Gait Deviations: None  Distance: 400'  Stairs/Curb  Stairs?: Yes  Stairs  # Steps : 4  Stairs Height: 6\"  Rails: Right ascending  Assistance: Independent     Balance  Posture: Good  Sitting - Static: Good  Sitting - Dynamic: Good  Standing - Static: Good  Standing - Dynamic: Good           OutComes Score                                                  AM-PAC - Mobility    AM-PAC Basic Mobility - Inpatient   How much help is needed turning from your back to your side while in a flat bed without using bedrails?: None  How much help is needed moving from lying on your back to sitting on the side of a flat bed without using bedrails?: None  How much help is needed moving to and from a bed to a chair?: None  How much help is needed standing up from a chair using your arms?: None  How much help is needed walking in hospital room?: None  How much help is needed climbing 3-5 steps with a railing?: None  AM-PAC Inpatient Mobility Raw Score : 24  AM-PAC Inpatient T-Scale Score : 61.14  Mobility Inpatient CMS 0-100% Score: 0  Mobility Inpatient CMS G-Code Modifier : CH         [place] Score       Goals  Short Term Goals  Time Frame for Short Term Goals: no acute PT goals to be addressed       Education  Patient Education  Education Given To: Patient  Education Provided: Role of Therapy;Plan of Care  Education Method: Demonstration;Verbal  Barriers to Learning: None  Education Outcome: Verbalized understanding      Therapy Time   Individual Concurrent Group Co-treatment   Time In 1415         Time Out 1438         Minutes 23              Timed Code Treatment Minutes:  8 min    Total Treatment Minutes:  23 min       Bell Aparicio PT

## 2024-01-12 LAB
F5 P.R506Q BLD/T QL: NEGATIVE
SPECIMEN SOURCE: NORMAL

## 2024-01-13 LAB
F2 C.20210G>A GENO BLD/T: NEGATIVE
SPECIMEN SOURCE: NORMAL

## 2024-01-18 LAB — MISCELLANEOUS LAB TEST ORDER: NORMAL

## 2024-03-22 ENCOUNTER — HOSPITAL ENCOUNTER (OUTPATIENT)
Dept: CT IMAGING | Age: 49
Discharge: HOME | End: 2024-03-22
Attending: NEUROLOGICAL SURGERY
Payer: COMMERCIAL

## 2024-03-22 DIAGNOSIS — I62.9 INTRACRANIAL HEMORRHAGE (HCC): ICD-10-CM

## 2024-03-22 DIAGNOSIS — G08 CEREBRAL VENOUS SINUS THROMBOSIS: ICD-10-CM

## 2024-03-22 DIAGNOSIS — G93.2 PSEUDOTUMOR CEREBRI: ICD-10-CM

## 2024-03-22 PROCEDURE — 6360000004 HC RX CONTRAST MEDICATION: Performed by: NEUROLOGICAL SURGERY

## 2024-03-22 PROCEDURE — 70496 CT ANGIOGRAPHY HEAD: CPT

## 2024-03-22 RX ADMIN — IOPAMIDOL 75 ML: 755 INJECTION, SOLUTION INTRAVENOUS at 12:21
